# Patient Record
Sex: MALE | Race: WHITE
[De-identification: names, ages, dates, MRNs, and addresses within clinical notes are randomized per-mention and may not be internally consistent; named-entity substitution may affect disease eponyms.]

---

## 2018-12-23 ENCOUNTER — HOSPITAL ENCOUNTER (INPATIENT)
Dept: HOSPITAL 10 - MS1 | Age: 63
LOS: 5 days | Discharge: HOME | DRG: 435 | End: 2018-12-28
Attending: INTERNAL MEDICINE | Admitting: HOSPITALIST
Payer: COMMERCIAL

## 2018-12-23 ENCOUNTER — HOSPITAL ENCOUNTER (INPATIENT)
Dept: HOSPITAL 91 - MS1 | Age: 63
LOS: 5 days | Discharge: HOME | DRG: 435 | End: 2018-12-28
Payer: COMMERCIAL

## 2018-12-23 VITALS — DIASTOLIC BLOOD PRESSURE: 67 MMHG | SYSTOLIC BLOOD PRESSURE: 123 MMHG | RESPIRATION RATE: 18 BRPM | HEART RATE: 61 BPM

## 2018-12-23 VITALS — SYSTOLIC BLOOD PRESSURE: 133 MMHG | DIASTOLIC BLOOD PRESSURE: 63 MMHG | HEART RATE: 63 BPM | RESPIRATION RATE: 16 BRPM

## 2018-12-23 VITALS
HEIGHT: 67 IN | WEIGHT: 176.37 LBS | HEIGHT: 67 IN | BODY MASS INDEX: 27.68 KG/M2 | WEIGHT: 176.37 LBS | BODY MASS INDEX: 27.68 KG/M2

## 2018-12-23 DIAGNOSIS — C23: Primary | ICD-10-CM

## 2018-12-23 DIAGNOSIS — I10: ICD-10-CM

## 2018-12-23 DIAGNOSIS — K83.1: ICD-10-CM

## 2018-12-23 DIAGNOSIS — I42.9: ICD-10-CM

## 2018-12-23 DIAGNOSIS — E11.9: ICD-10-CM

## 2018-12-23 DIAGNOSIS — Z95.1: ICD-10-CM

## 2018-12-23 DIAGNOSIS — I25.10: ICD-10-CM

## 2018-12-23 DIAGNOSIS — E78.5: ICD-10-CM

## 2018-12-23 PROCEDURE — 82962 GLUCOSE BLOOD TEST: CPT

## 2018-12-23 PROCEDURE — 85730 THROMBOPLASTIN TIME PARTIAL: CPT

## 2018-12-23 PROCEDURE — 74330 X-RAY BILE/PANC ENDOSCOPY: CPT

## 2018-12-23 PROCEDURE — 93005 ELECTROCARDIOGRAM TRACING: CPT

## 2018-12-23 PROCEDURE — 97161 PT EVAL LOW COMPLEX 20 MIN: CPT

## 2018-12-23 PROCEDURE — 80053 COMPREHEN METABOLIC PANEL: CPT

## 2018-12-23 PROCEDURE — 88305 TISSUE EXAM BY PATHOLOGIST: CPT

## 2018-12-23 PROCEDURE — 82105 ALPHA-FETOPROTEIN SERUM: CPT

## 2018-12-23 PROCEDURE — 85025 COMPLETE CBC W/AUTO DIFF WBC: CPT

## 2018-12-23 PROCEDURE — C2617 STENT, NON-COR, TEM W/O DEL: HCPCS

## 2018-12-23 PROCEDURE — 71250 CT THORAX DX C-: CPT

## 2018-12-23 PROCEDURE — 88104 CYTOPATH FL NONGYN SMEARS: CPT

## 2018-12-23 PROCEDURE — 83036 HEMOGLOBIN GLYCOSYLATED A1C: CPT

## 2018-12-23 PROCEDURE — 74181 MRI ABDOMEN W/O CONTRAST: CPT

## 2018-12-23 PROCEDURE — 86301 IMMUNOASSAY TUMOR CA 19-9: CPT

## 2018-12-23 PROCEDURE — 85610 PROTHROMBIN TIME: CPT

## 2018-12-23 PROCEDURE — 93306 TTE W/DOPPLER COMPLETE: CPT

## 2018-12-23 NOTE — HP
Date/Time of Note


Date/Time of Note


DATE: 12/23/18 


TIME: 23:24





Assessment/Plan


VTE Prophylaxis


SCD applied (from Nsg):  Yes


Pharmacological prophylaxis:  NA/contraindicated


Pharm contraindication:  other (Patient awaiting for a possible biopsy)





Assessment/Plan


Assessment/Plan





 63-year-old male with a history of CAD with CABG, hypertension, dyslipidemia 


with painless jaundice, pruritus, 20 pounds weight loss in 6 weeks and petechial


rash with a CT finding suspicious for metastatic cholangiocarcinoma.  Lab shows 


hyperbilirubinemia with T-bili of 13





PLAN


-Need a tissue biopsy to confirm the diagnosis.  Daytime MD will talk to IR in 


the morning


-Surgery and oncology consult


-Antihistamine and Ursodiol for pruritus


-Patient status post permethrin cream as outpatient for a rash suspicious for 


scabies, but without improvement.  We may repeat treatment


-Continue home meds with adjustment as needed





HPI/ROS


Admit Date/Time


Admit Date/Time


Dec 23, 2018 at 19:55





Hx of Present Illness





This is a 63-year-old male with a history of CAD with CABG, hypertension, 


dyslipidemia who initially presented on outside hospital complaining of 


yellowing of skin, itching and rash.  Symptoms started over 2 weeks ago and has 


been progressively getting worse.  Patient has significant jaundice, more p


ronounced in his upper extremities and chest.  His total bilirubin at outside 


facility was 13.  He also has petechial rash which resembles scabs.  Patient 


reported a 20 pound weight loss in about 6 weeks.  Reported decreased p.o. 


intake because of decreased appetite.  Denied abdominal pain, chest pain, 


shortness of breath, urinary symptoms, constipation or diarrhea.  Prior to going


to the outside hospital ER, he was seen by his PMD and was prescribed Benadryl, 


prednisone and permethrin cream, but problem persisted.  Patient had CT 


abdomen/pelvis at the outside facility which showed:





1.  Hypoenhancing mass in the central liver, 6.2 x 5.9 x 5.4 cm, suspicious for 


cholangiocarcinoma, versus other hepatic tumor.  There is extension along the 


portal hilum and gallbladder neck.  There is intrahepatic biliary dilatation.


2.  Mild thickened appearance of the adjacent upper hepatic flexure colonic 


wall, which could be reactive, although colonic involvement is not excluded.


3.  Enlarged celiac lymph node, 2.3 x 1.6 cm, consistent with metastatic 


involvement


4.  Left renal cyst, 2.5 cm


.





PMH/Family/Social


Past Medical History


Medical History:  other (See HPI)


Coded Allergies:  


     No Known Allergy (Verified , 5/23/16)





Past Surgical History


Past Surgical Hx:  other (See HPI)





Family History


Significant Family History:  no pertinent family hx





Social History


Alcohol Use:  none


Smoking Status:  Former smoker


Drug Use:  none





Exam/Review of Systems


Vital Signs


Vitals





Vital Signs


  Date      Temp  Pulse  Resp  B/P (MAP)   Pulse Ox  O2          O2 Flow    FiO2


Time                                                 Delivery    Rate


  12/23/18  97.0     61    18      123/67        99  Room Air


     21:24                           (85)








Exam


Constitutional:  alert, oriented, well developed


Head:  normocephalic, atraumatic


Eyes:  EOMI, PERRL, icteric


Respiratory:  clear to auscultation, normal air movement


Cardiovascular:  regular rate and rhythm, nl pulses


Gastrointestinal:  soft, non-tender


Extremities:  normal pulses


Skin:  other (Skin is jaundiced and has a petechial rash resembling scabies on 


several part of his upper extremities and chest and back)





Results


Results 24 hrs





Laboratory Tests


                        Test
            12/23/18
20:40


                        Bedside Glucose          228  H














ADRI PEREZ MD                 Dec 23, 2018 23:24

## 2018-12-23 NOTE — NUR
NRSG NOTE:

PT ARRIVED ON FLOOR AT 2015 IN STABLE CONDITION. AOX4. NO ACUTE DISTRESS NOTED. NO SOB. VSS. 
DENIED PAIN. Welsh SPEAKING, UNDERSTANDS BASIC ENGLISH. ACCOMPANIED BY DAUGHTERS, WIFE. 
REPORT WAS GIVEN TO MARCELLE MCGOWAN. TRANSFERRED TO  IN STABLE CONDITION VIA TRANSPORT ON BED.

## 2018-12-24 VITALS — RESPIRATION RATE: 21 BRPM | DIASTOLIC BLOOD PRESSURE: 77 MMHG | SYSTOLIC BLOOD PRESSURE: 140 MMHG | HEART RATE: 74 BPM

## 2018-12-24 VITALS — HEART RATE: 74 BPM | RESPIRATION RATE: 23 BRPM | SYSTOLIC BLOOD PRESSURE: 140 MMHG | DIASTOLIC BLOOD PRESSURE: 63 MMHG

## 2018-12-24 VITALS — HEART RATE: 76 BPM | SYSTOLIC BLOOD PRESSURE: 132 MMHG | DIASTOLIC BLOOD PRESSURE: 61 MMHG | RESPIRATION RATE: 15 BRPM

## 2018-12-24 VITALS — RESPIRATION RATE: 24 BRPM | DIASTOLIC BLOOD PRESSURE: 79 MMHG | SYSTOLIC BLOOD PRESSURE: 158 MMHG | HEART RATE: 72 BPM

## 2018-12-24 VITALS — RESPIRATION RATE: 22 BRPM | DIASTOLIC BLOOD PRESSURE: 75 MMHG | SYSTOLIC BLOOD PRESSURE: 147 MMHG | HEART RATE: 72 BPM

## 2018-12-24 VITALS — SYSTOLIC BLOOD PRESSURE: 143 MMHG | DIASTOLIC BLOOD PRESSURE: 76 MMHG | HEART RATE: 75 BPM | RESPIRATION RATE: 20 BRPM

## 2018-12-24 VITALS — SYSTOLIC BLOOD PRESSURE: 158 MMHG | RESPIRATION RATE: 21 BRPM | DIASTOLIC BLOOD PRESSURE: 81 MMHG | HEART RATE: 76 BPM

## 2018-12-24 VITALS — SYSTOLIC BLOOD PRESSURE: 163 MMHG | RESPIRATION RATE: 16 BRPM | DIASTOLIC BLOOD PRESSURE: 83 MMHG | HEART RATE: 85 BPM

## 2018-12-24 VITALS — HEART RATE: 67 BPM | RESPIRATION RATE: 18 BRPM | SYSTOLIC BLOOD PRESSURE: 128 MMHG | DIASTOLIC BLOOD PRESSURE: 64 MMHG

## 2018-12-24 VITALS — DIASTOLIC BLOOD PRESSURE: 81 MMHG | HEART RATE: 73 BPM | RESPIRATION RATE: 20 BRPM | SYSTOLIC BLOOD PRESSURE: 172 MMHG

## 2018-12-24 VITALS — HEART RATE: 76 BPM | DIASTOLIC BLOOD PRESSURE: 76 MMHG | SYSTOLIC BLOOD PRESSURE: 137 MMHG | RESPIRATION RATE: 20 BRPM

## 2018-12-24 VITALS — DIASTOLIC BLOOD PRESSURE: 61 MMHG | SYSTOLIC BLOOD PRESSURE: 138 MMHG | HEART RATE: 74 BPM | RESPIRATION RATE: 18 BRPM

## 2018-12-24 VITALS — SYSTOLIC BLOOD PRESSURE: 139 MMHG | RESPIRATION RATE: 17 BRPM | DIASTOLIC BLOOD PRESSURE: 67 MMHG | HEART RATE: 74 BPM

## 2018-12-24 VITALS — DIASTOLIC BLOOD PRESSURE: 66 MMHG | RESPIRATION RATE: 20 BRPM | HEART RATE: 74 BPM | SYSTOLIC BLOOD PRESSURE: 139 MMHG

## 2018-12-24 VITALS — HEART RATE: 72 BPM | SYSTOLIC BLOOD PRESSURE: 152 MMHG | DIASTOLIC BLOOD PRESSURE: 72 MMHG | RESPIRATION RATE: 21 BRPM

## 2018-12-24 VITALS — HEART RATE: 74 BPM | SYSTOLIC BLOOD PRESSURE: 138 MMHG | RESPIRATION RATE: 18 BRPM | DIASTOLIC BLOOD PRESSURE: 61 MMHG

## 2018-12-24 VITALS — RESPIRATION RATE: 20 BRPM | SYSTOLIC BLOOD PRESSURE: 178 MMHG | DIASTOLIC BLOOD PRESSURE: 81 MMHG | HEART RATE: 69 BPM

## 2018-12-24 VITALS — HEART RATE: 74 BPM | RESPIRATION RATE: 16 BRPM | SYSTOLIC BLOOD PRESSURE: 148 MMHG | DIASTOLIC BLOOD PRESSURE: 75 MMHG

## 2018-12-24 VITALS — SYSTOLIC BLOOD PRESSURE: 152 MMHG | HEART RATE: 70 BPM | RESPIRATION RATE: 17 BRPM | DIASTOLIC BLOOD PRESSURE: 70 MMHG

## 2018-12-24 VITALS — DIASTOLIC BLOOD PRESSURE: 79 MMHG | RESPIRATION RATE: 24 BRPM | SYSTOLIC BLOOD PRESSURE: 162 MMHG | HEART RATE: 78 BPM

## 2018-12-24 VITALS — DIASTOLIC BLOOD PRESSURE: 80 MMHG | RESPIRATION RATE: 16 BRPM | SYSTOLIC BLOOD PRESSURE: 135 MMHG | HEART RATE: 87 BPM

## 2018-12-24 VITALS — DIASTOLIC BLOOD PRESSURE: 78 MMHG | SYSTOLIC BLOOD PRESSURE: 153 MMHG | RESPIRATION RATE: 24 BRPM | HEART RATE: 74 BPM

## 2018-12-24 VITALS — DIASTOLIC BLOOD PRESSURE: 74 MMHG | SYSTOLIC BLOOD PRESSURE: 153 MMHG | HEART RATE: 76 BPM | RESPIRATION RATE: 18 BRPM

## 2018-12-24 VITALS — SYSTOLIC BLOOD PRESSURE: 165 MMHG | RESPIRATION RATE: 20 BRPM | DIASTOLIC BLOOD PRESSURE: 76 MMHG | HEART RATE: 68 BPM

## 2018-12-24 VITALS — SYSTOLIC BLOOD PRESSURE: 146 MMHG | DIASTOLIC BLOOD PRESSURE: 77 MMHG | HEART RATE: 69 BPM | RESPIRATION RATE: 20 BRPM

## 2018-12-24 VITALS — DIASTOLIC BLOOD PRESSURE: 69 MMHG | RESPIRATION RATE: 23 BRPM | HEART RATE: 80 BPM | SYSTOLIC BLOOD PRESSURE: 141 MMHG

## 2018-12-24 VITALS — DIASTOLIC BLOOD PRESSURE: 81 MMHG | HEART RATE: 82 BPM | RESPIRATION RATE: 25 BRPM | SYSTOLIC BLOOD PRESSURE: 146 MMHG

## 2018-12-24 VITALS — SYSTOLIC BLOOD PRESSURE: 173 MMHG | DIASTOLIC BLOOD PRESSURE: 81 MMHG | RESPIRATION RATE: 20 BRPM | HEART RATE: 69 BPM

## 2018-12-24 VITALS — DIASTOLIC BLOOD PRESSURE: 69 MMHG | RESPIRATION RATE: 17 BRPM | HEART RATE: 76 BPM | SYSTOLIC BLOOD PRESSURE: 147 MMHG

## 2018-12-24 VITALS — HEART RATE: 80 BPM | RESPIRATION RATE: 16 BRPM | DIASTOLIC BLOOD PRESSURE: 87 MMHG | SYSTOLIC BLOOD PRESSURE: 133 MMHG

## 2018-12-24 VITALS — SYSTOLIC BLOOD PRESSURE: 163 MMHG | DIASTOLIC BLOOD PRESSURE: 83 MMHG | RESPIRATION RATE: 20 BRPM

## 2018-12-24 LAB
ADD MAN DIFF?: NO
ALANINE AMINOTRANSFERASE: 94 IU/L (ref 13–69)
ALBUMIN/GLOBULIN RATIO: 1.23
ALBUMIN: 3.7 G/DL (ref 3.3–4.9)
ALKALINE PHOSPHATASE: 249 IU/L (ref 42–121)
ALPHA FETOPROTEIN: 2.64 IU/L (ref 0–7.21)
ANION GAP: 14 (ref 5–13)
ASPARTATE AMINO TRANSFERASE: 59 IU/L (ref 15–46)
BASOPHIL #: 0 10^3/UL (ref 0–0.1)
BASOPHILS %: 0.4 % (ref 0–2)
BILIRUBIN,DIRECT: 7.7 MG/DL (ref 0–0.2)
BILIRUBIN,TOTAL: 9.4 MG/DL (ref 0.2–1.3)
BLOOD UREA NITROGEN: 12 MG/DL (ref 7–20)
CALCIUM: 9.1 MG/DL (ref 8.4–10.2)
CANCER ANTIGEN 19-9: 4640 U/ML (ref 0–37)
CARBON DIOXIDE: 24 MMOL/L (ref 21–31)
CHLORIDE: 99 MMOL/L (ref 97–110)
CREATININE: 0.61 MG/DL (ref 0.61–1.24)
EOSINOPHILS #: 0 10^3/UL (ref 0–0.5)
EOSINOPHILS %: 0.4 % (ref 0–7)
GLOBULIN: 3 G/DL (ref 1.3–3.2)
GLUCOSE: 246 MG/DL (ref 70–220)
HEMATOCRIT: 35.1 % (ref 42–52)
HEMOGLOBIN A1C: 10.1 % (ref 0–5.9)
HEMOGLOBIN: 12.3 G/DL (ref 14–18)
IMMATURE GRANS #M: 0.03 10^3/UL (ref 0–0.03)
IMMATURE GRANS % (M): 0.4 % (ref 0–0.43)
INR: 0.98
LYMPHOCYTES #: 1.9 10^3/UL (ref 0.8–2.9)
LYMPHOCYTES %: 25.5 % (ref 15–51)
MEAN CORPUSCULAR HEMOGLOBIN: 28.9 PG (ref 29–33)
MEAN CORPUSCULAR HGB CONC: 35 G/DL (ref 32–37)
MEAN CORPUSCULAR VOLUME: 82.6 FL (ref 82–101)
MEAN PLATELET VOLUME: 11.2 FL (ref 7.4–10.4)
MONOCYTE #: 0.8 10^3/UL (ref 0.3–0.9)
MONOCYTES %: 10.3 % (ref 0–11)
NEUTROPHIL #: 4.6 10^3/UL (ref 1.6–7.5)
NEUTROPHILS %: 63 % (ref 39–77)
NUCLEATED RED BLOOD CELLS #: 0 10^3/UL (ref 0–0)
NUCLEATED RED BLOOD CELLS%: 0 /100WBC (ref 0–0)
PARTIAL THROMBOPLASTIN TIME: 25.7 SEC (ref 23–35)
PLATELET COUNT: 253 10^3/UL (ref 140–415)
POTASSIUM: 4 MMOL/L (ref 3.5–5.1)
PROTIME: 13.1 SEC (ref 11.9–14.9)
PT RATIO: 1
RED BLOOD COUNT: 4.25 10^6/UL (ref 4.7–6.1)
RED CELL DISTRIBUTION WIDTH: 15.4 % (ref 11.5–14.5)
SODIUM: 137 MMOL/L (ref 135–144)
TOTAL PROTEIN: 6.7 G/DL (ref 6.1–8.1)
WHITE BLOOD COUNT: 7.3 10^3/UL (ref 4.8–10.8)

## 2018-12-24 PROCEDURE — 0F758DZ DILATION OF RIGHT HEPATIC DUCT WITH INTRALUMINAL DEVICE, VIA NATURAL OR ARTIFICIAL OPENING ENDOSCOPIC: ICD-10-PCS

## 2018-12-24 PROCEDURE — 0F768DZ DILATION OF LEFT HEPATIC DUCT WITH INTRALUMINAL DEVICE, VIA NATURAL OR ARTIFICIAL OPENING ENDOSCOPIC: ICD-10-PCS | Performed by: INTERNAL MEDICINE

## 2018-12-24 PROCEDURE — 0F768DZ DILATION OF LEFT HEPATIC DUCT WITH INTRALUMINAL DEVICE, VIA NATURAL OR ARTIFICIAL OPENING ENDOSCOPIC: ICD-10-PCS

## 2018-12-24 PROCEDURE — 0FD98ZX EXTRACTION OF COMMON BILE DUCT, VIA NATURAL OR ARTIFICIAL OPENING ENDOSCOPIC, DIAGNOSTIC: ICD-10-PCS

## 2018-12-24 PROCEDURE — 0FD98ZX EXTRACTION OF COMMON BILE DUCT, VIA NATURAL OR ARTIFICIAL OPENING ENDOSCOPIC, DIAGNOSTIC: ICD-10-PCS | Performed by: INTERNAL MEDICINE

## 2018-12-24 PROCEDURE — 0F758DZ DILATION OF RIGHT HEPATIC DUCT WITH INTRALUMINAL DEVICE, VIA NATURAL OR ARTIFICIAL OPENING ENDOSCOPIC: ICD-10-PCS | Performed by: INTERNAL MEDICINE

## 2018-12-24 RX ADMIN — DIPHENHYDRAMINE HYDROCHLORIDE 1 MG: 50 INJECTION, SOLUTION INTRAMUSCULAR; INTRAVENOUS at 07:01

## 2018-12-24 RX ADMIN — ATORVASTATIN CALCIUM SCH MG: 20 TABLET, FILM COATED ORAL at 20:05

## 2018-12-24 RX ADMIN — INSULIN ASPART 1 UNIT: 100 INJECTION, SOLUTION INTRAVENOUS; SUBCUTANEOUS at 12:44

## 2018-12-24 RX ADMIN — URSODIOL SCH MG: 300 CAPSULE ORAL at 10:14

## 2018-12-24 RX ADMIN — PANTOPRAZOLE SODIUM SCH MG: 40 TABLET, DELAYED RELEASE ORAL at 06:00

## 2018-12-24 RX ADMIN — URSODIOL 1 MG: 300 CAPSULE ORAL at 20:05

## 2018-12-24 RX ADMIN — INSULIN GLARGINE 1 UNITS: 100 INJECTION, SOLUTION SUBCUTANEOUS at 20:34

## 2018-12-24 RX ADMIN — URSODIOL SCH MG: 300 CAPSULE ORAL at 20:05

## 2018-12-24 RX ADMIN — INSULIN ASPART 1 UNIT: 100 INJECTION, SOLUTION INTRAVENOUS; SUBCUTANEOUS at 08:56

## 2018-12-24 RX ADMIN — INSULIN ASPART 1 UNIT: 100 INJECTION, SOLUTION INTRAVENOUS; SUBCUTANEOUS at 18:00

## 2018-12-24 RX ADMIN — INDOMETHACIN 1 MG: 50 SUPPOSITORY RECTAL at 15:40

## 2018-12-24 RX ADMIN — BENAZEPRIL HYDROCHLORIDE SCH MG: 10 TABLET ORAL at 08:45

## 2018-12-24 RX ADMIN — URSODIOL 1 MG: 300 CAPSULE ORAL at 12:42

## 2018-12-24 RX ADMIN — INSULIN ASPART 1 UNIT: 100 INJECTION, SOLUTION INTRAVENOUS; SUBCUTANEOUS at 18:53

## 2018-12-24 RX ADMIN — INSULIN ASPART 1 UNIT: 100 INJECTION, SOLUTION INTRAVENOUS; SUBCUTANEOUS at 17:00

## 2018-12-24 RX ADMIN — ASCORBIC ACID, VITAMIN A PALMITATE, CHOLECALCIFEROL, THIAMINE HYDROCHLORIDE, RIBOFLAVIN-5 PHOSPHATE SODIUM, PYRIDOXINE HYDROCHLORIDE, NIACINAMIDE, DEXPANTHENOL, ALPHA-TOCOPHEROL ACETATE, VITAMIN K1, FOLIC ACID, BIOTIN, CYANOCOBALAMIN 1 MLS/HR: 200; 3300; 200; 6; 3.6; 6; 40; 15; 10; 150; 600; 60; 5 INJECTION, SOLUTION INTRAVENOUS at 08:42

## 2018-12-24 RX ADMIN — ATORVASTATIN CALCIUM 1 MG: 20 TABLET, FILM COATED ORAL at 20:05

## 2018-12-24 RX ADMIN — BENAZEPRIL HYDROCHLORIDE 1 MG: 10 TABLET, FILM COATED ORAL at 08:45

## 2018-12-24 RX ADMIN — INSULIN ASPART 1 UNIT: 100 INJECTION, SOLUTION INTRAVENOUS; SUBCUTANEOUS at 12:00

## 2018-12-24 RX ADMIN — URSODIOL 1 MG: 300 CAPSULE ORAL at 10:14

## 2018-12-24 RX ADMIN — PANTOPRAZOLE SODIUM 1 MG: 40 TABLET, DELAYED RELEASE ORAL at 06:00

## 2018-12-24 RX ADMIN — INSULIN ASPART 1 UNIT: 100 INJECTION, SOLUTION INTRAVENOUS; SUBCUTANEOUS at 20:35

## 2018-12-24 RX ADMIN — URSODIOL SCH MG: 300 CAPSULE ORAL at 12:42

## 2018-12-24 NOTE — NUR
Received report from ROSLYN Gaona. Pt is a direct admit from [facility]. Pt accompanied by 
wife and two daughters. Pt in no acute distress. Admission assessment started at 2120. VSS. 
No complaints of pain. Dr. Mazariegos inputted orders and contacted re: pt's blood glucose of 231. 
Dr. Mazariegos ordered pt to be put on mild sliding scale, orders carried out. Pt resting 
comfortably in bed, call light within reach.

## 2018-12-24 NOTE — PREAC
Date/Time of Note


Date/Time of Note


DATE: 12/24/18 


TIME: 16:14





Anesthesia Eval and Record


Evaluation


Time Pre-Procedure Interview


DATE: 12/24/18 


TIME: 16:14


Age


63


Sex


male


NPO:  8 hrs


Preoperative diagnosis


Cholestasis, Obstructive Jaundice


Planned procedure


ERCP





Past Medical History


Past Medical History:  Includes


Cardio:  Dyslipidemia, CAD, CABG


Endo:  Diabetes


Heme:  Anemia





Surgery & Anesthesia Issues


No known issue





Meds


Anticoagulation:  No


Beta Blocker within 24 hr:  Yes


Reported Medications


Linagliptin (TRADJENTA) 5 Mg Tablet, 5 MG PO DAILY, TAB


   12/23/18


Simvastatin (Simvastatin) 40 Mg Tablet, 40 MG PO DAILY, #30 TAB


   12/23/18


Benazepril Hcl* (Benazepril Hcl*) 10 Mg Tablet, 10 MG PO DAILY, #30 TAB


   12/23/18


Glimepiride* (Amaryl*) 4 Mg Tablet, 4 MG PO DAILY, TAB


   12/23/18


Metformin Hcl* (Metformin Hcl*) 850 Mg Tablet, 850 MG PO BID, #30 TAB


   12/23/18


Linagliptin (TRADJENTA) 5 Mg Tablet, 5 MG PO DAILY, TAB


   12/23/18


[Unknown]   No Conflict Check


   2/15/10


Discontinued Reported Medications


Aspirin* (Aspirin* EC) 81 Mg Tablet.dr, 81 MG PO DAILY, TAB


   5/23/16


Ramipril (Ramipril) 5 Mg Capsule, 5 MG PO DAILY, CAP


   5/23/16


Glyburide, Micro/Metformin Hcl (Glyburide-Metformin) 2.5-500 Tab, 1 TAB PO 


DAILY, TAB


   5/23/16


Metoprolol Tartrate* (Lopressor*) 25 Mg Tab, 25 MG PO DAILY, #60 TAB


   5/23/16


Discontinued Scripts


Acetaminophen* (Tylophen*) 500 Mg Capsule, 1 CAP PO Q6H PRN for PAIN AND OR 


ELEVATED TEMP, #30 CAP


   Prov:SIRIA BROWN PA-C         8/9/16


Acetaminophen-Codeine* (Acetaminophen-Cod #3*) 300-30 Mg Tab, 1 TAB PO Q4H PRN 


for PAIN, #10 TAB


   Prov:WILBERTO ANTHONY MD         8/1/16


Erythromycin (Erythromycin Opth) 3.5 Gm Oint..gm., 1 APPLIC LEFT EYE QID for 7 


Days, EA


   Prov:WILBERTO ANTHONY MD         8/1/16





Current Medications


IV Flush (NS 3 ml) 3 ml PER PROTOCOL IV ;  Start 12/23/18 at 23:30


Ondansetron HCl (Zofran Inj) 4 mg Q6H  PRN IV NAUSEA AND/OR VOMITING;  Start 


12/23/18 at 23:30


Acetaminophen (Tylenol Tab) 650 mg Q6H  PRN PO PAIN LEVEL 1-3 OR FEVER;  Start 


12/23/18 at 23:30


Acetaminophen/ Hydrocodone Bitart (Norco (5/325)) 1 tab Q6H  PRN PO PAIN LEVEL 


4-6;  Start 12/23/18 at 23:30


Acetaminophen/ Hydrocodone Bitart (Norco (5/325)) 2 tab Q6H  PRN PO PAIN LEVEL 


7-10;  Start 12/23/18 at 23:30


Pantoprazole (Protonix Tab) 40 mg DAILY@06 PO  Last administered on 12/24/18at 


06:00; Admin Dose 40 MG;  Start 12/24/18 at 06:00


Benazepril HCl (Lotensin) 10 mg DAILY PO  Last administered on 12/24/18at 08:45;


 Admin Dose 10 MG;  Start 12/24/18 at 09:00


Atorvastatin Calcium (Lipitor) 20 mg DAILY@21 PO ;  Start 12/24/18 at 21:00


Miscellaneous Information 1 ea NOTE XX ;  Start 12/24/18 at 02:30


Glucose (Glutose) 15 gm Q15M  PRN PO DECREASED GLUCOSE;  Start 12/24/18 at 02:30


Glucose (Glutose) 22.5 gm Q15M  PRN PO DECREASED GLUCOSE;  Start 12/24/18 at 


02:30


Dextrose (D50w Syringe) 25 ml Q15M  PRN IV DECREASED GLUCOSE;  Start 12/24/18 at


 02:30


Dextrose (D50w Syringe) 50 ml Q15M  PRN IV DECREASED GLUCOSE;  Start 12/24/18 at


 02:30


Glucagon (Glucagen) 1 mg Q15M  PRN IM DECREASED GLUCOSE;  Start 12/24/18 at 


02:30


Glucose (Glutose) 15 gm Q15M  PRN BUCCAL DECREASED GLUCOSE;  Start 12/24/18 at 


02:30


Diphenhydramine HCl (Benadryl) 25 mg Q6H  PRN IV itching Last administered on 


12/24/18at 07:01; Admin Dose 25 MG;  Start 12/24/18 at 07:00


Ursodiol (Actigall) 300 mg TID PO  Last administered on 12/24/18at 12:42; Admin 


Dose 300 MG;  Start 12/24/18 at 09:00


Insulin Aspart (Novolog Insulin Pen) NOVOLOG *MILD* ALGORI... Q4 SC  Last 


administered on 12/24/18at 12:44; Admin Dose 3 UNIT;  Start 12/24/18 at 09:00


Insulin Glargine (Lantus) 12 units DAILY@2000 SC ;  Start 12/24/18 at 20:00


Insulin Aspart (Novolog Insulin Pen) 4 unit WITH  MEALS SC ;  Start 12/24/18 at 


12:00


Meds reviewed:  Yes





Allergies


Coded Allergies:  


     No Known Allergy (Verified , 5/23/16)


Allergies Reviewed:  Yes





Labs/Studies


Labs Reviewed:  Reviewed by anesthesiologist


Result Diagram:  


12/24/18 0035                                                                   


             12/24/18 0035





Laboratory Tests


12/24/18 00:35








Pregnancy test:  N/A


Studies:  ECG (SB, LAD), CXR (n/a)





Pre-procedure Exam


Last vitals





Vital Signs


  Date      Temp  Pulse  Resp  B/P (MAP)   Pulse Ox  O2          O2 Flow    FiO2


Time                                                 Delivery    Rate


  12/24/18  98.0     74    17      139/67        99  Room Air


     08:41                           (91)





Airway:  Adequate mouth opening, Adequate thyromental dist


Mallampati:  Mallampati II


Teeth:  Normal


Lung:  Normal


Heart:  Normal





ASA Physical Status


ASA physical status:  3


Emergency:  None





Planned Anesthetic


General/MAC:  ETT





Planned Pain Management


Parenteral pain med





Pre-operative Attestations


Prior to commencing anesthesia and surgery, the patient was re-evaluated, there 


was verification of:


*The patient's identity


*The results of appropriate recent lab work and preoperative vital signs


*The above evaluation not changing prior to induction


*Anesthetic plan, risk benefits, alternative and complications discussed with 


patient/family; questions answered; patient/family understands, accepts and 


wishes to proceed.











SHANA REYES MD              Dec 24, 2018 16:19

## 2018-12-24 NOTE — NUR
EOSS



VSS. Pt denies pain. Pt complains of itchiness, Dr. Mazariegos made aware. Pt instructed to use 
call light.

## 2018-12-24 NOTE — PN
Date/Time of Note


Date/Time of Note


DATE: 12/24/18 


TIME: 15:47





Assessment/Plan


VTE Prophylaxis


Risk score (from Nsg)>0 risk:  5


SCD applied (from Nsg):  Yes


Pharmacological prophylaxis:  heparin





Lines/Catheters


IV Catheter Type (from Nrsg):  Peripheral IV





Assessment/Plan


Hospital Course


64 yo male with CAD, DMII who presents with painless jaundice and obstructing 


mass


- ERCP today per Dr Maynard





DMII;


- Basal/bolus insulin with home medications





CAD:


- Statin





Discharge following endoscopic management


Result Diagram:  


12/24/18 0035                                                                   


            12/24/18 0035





Results 24hrs





Laboratory Tests


Test
             12/23/18
20:40  12/24/18
00:35  12/24/18
01:08  12/24/18
08:31


Bedside Glucose           228  H                          231  H


White Blood                                7.3


Count


Red Blood Count                          4.25  L


Hemoglobin                               12.3  L


Hematocrit                               35.1  L


Mean Corpuscular                          82.6


Volume


Mean Corpuscular                         28.9  L


Hemoglobin


Mean Corpuscular  
                      35.0  
  
               



Hemoglobin
Alicia


nt


Red Cell                                 15.4  H


Distribution


Width


Platelet Count                             253


Mean Platelet                            11.2  H


Volume


Immature                                 0.400


Granulocytes %


Neutrophils %                             63.0


Lymphocytes %                             25.5


Monocytes %                               10.3


Eosinophils %                              0.4


Basophils %                                0.4


Nucleated Red                              0.0


Blood Cells %


Immature                                 0.030


Granulocytes #


Neutrophils #                              4.6


Lymphocytes #                              1.9


Monocytes #                                0.8


Eosinophils #                              0.0


Basophils #                                0.0


Nucleated Red                              0.0


Blood Cells #


Sodium Level                               137


Potassium Level                            4.0


Chloride Level                              99


Carbon Dioxide                              24


Level


Anion Gap                                  14  H


Blood Urea                                  12


Nitrogen


Creatinine                                0.61


Est Glomerular    
               > 60  
         
               



Filtrat


Rate
mL/min


Glucose Level                             246  H


Hemoglobin A1c                           10.1  H


Calcium Level                              9.1


Total Bilirubin                           9.4  H


Direct Bilirubin                         7.70  H


Indirect                                  1.7  H


Bilirubin


Aspartate Amino   
                       59  H
  
               



Transf
(AST/SGOT


)


Alanine           
                       94  H
  
               



Aminotransferase



(ALT/SGPT)


Alkaline                                  249  H


Phosphatase


Total Protein                              6.7


Albumin                                    3.7


Globulin                                  3.00


Albumin/Globulin                          1.23


Ratio


Prothrombin Time                                                          13.1


Prothrombin Time                                                           1.0


Ratio


INR               
               
               
                      0.98  



International


Normalized
Ratio


Activated         
               
               
                      25.7  



Partial
Thrombop


last Time


Test
             12/24/18
08:54  12/24/18
12:42  
               



Bedside Glucose           243  H          240  H








Subjective


24 Hr Interval Summary


Free Text/Dictation


Feels well


Aware of likely cancer diagnosis and need for endoscopy





Exam/Review of Systems


Vital Signs


Vitals





Vital Signs


  Date      Temp  Pulse  Resp  B/P (MAP)   Pulse Ox  O2          O2 Flow    FiO2


Time                                                 Delivery    Rate


  12/24/18  98.0     74    17      139/67        99  Room Air


     08:41                           (91)








Intake and Output





12/23/18 12/23/18 12/24/18





1515:00


23:00


07:00





IntakeIntake Total


180 ml





OutputOutput Total


1 ml





BalanceBalance


179 ml














Medications


Medications





Current Medications


IV Flush (NS 3 ml) 3 ml PER PROTOCOL IV ;  Start 12/23/18 at 23:30


Ondansetron HCl (Zofran Inj) 4 mg Q6H  PRN IV NAUSEA AND/OR VOMITING;  Start 


12/23/18 at 23:30


Acetaminophen (Tylenol Tab) 650 mg Q6H  PRN PO PAIN LEVEL 1-3 OR FEVER;  Start 


12/23/18 at 23:30


Acetaminophen/ Hydrocodone Bitart (Norco (5/325)) 1 tab Q6H  PRN PO PAIN LEVEL 


4-6;  Start 12/23/18 at 23:30


Acetaminophen/ Hydrocodone Bitart (Norco (5/325)) 2 tab Q6H  PRN PO PAIN LEVEL 


7-10;  Start 12/23/18 at 23:30


Pantoprazole (Protonix Tab) 40 mg DAILY@06 PO  Last administered on 12/24/18at 


06:00; Admin Dose 40 MG;  Start 12/24/18 at 06:00


Benazepril HCl (Lotensin) 10 mg DAILY PO  Last administered on 12/24/18at 08:45;


Admin Dose 10 MG;  Start 12/24/18 at 09:00


Atorvastatin Calcium (Lipitor) 20 mg DAILY@21 PO ;  Start 12/24/18 at 21:00


Diagnostic Test (Pha) (Accu-Chek) 1 ea 02 XX ;  Start 12/25/18 at 02:00


Miscellaneous Information 1 ea NOTE XX ;  Start 12/24/18 at 02:30


Glucose (Glutose) 15 gm Q15M  PRN PO DECREASED GLUCOSE;  Start 12/24/18 at 02:30


Glucose (Glutose) 22.5 gm Q15M  PRN PO DECREASED GLUCOSE;  Start 12/24/18 at 


02:30


Dextrose (D50w Syringe) 25 ml Q15M  PRN IV DECREASED GLUCOSE;  Start 12/24/18 at


02:30


Dextrose (D50w Syringe) 50 ml Q15M  PRN IV DECREASED GLUCOSE;  Start 12/24/18 at


02:30


Glucagon (Glucagen) 1 mg Q15M  PRN IM DECREASED GLUCOSE;  Start 12/24/18 at 


02:30


Glucose (Glutose) 15 gm Q15M  PRN BUCCAL DECREASED GLUCOSE;  Start 12/24/18 at 


02:30


Diphenhydramine HCl (Benadryl) 25 mg Q6H  PRN IV itching Last administered on 


12/24/18at 07:01; Admin Dose 25 MG;  Start 12/24/18 at 07:00


Ursodiol (Actigall) 300 mg TID PO  Last administered on 12/24/18at 12:42; Admin 


Dose 300 MG;  Start 12/24/18 at 09:00


Diagnostic Test (Pha) (Accu-Chek) 1 ea 02 XX ;  Start 12/25/18 at 02:00


Insulin Aspart (Novolog Insulin Pen) NOVOLOG *MILD* ALGORI... Q4 SC  Last 


administered on 12/24/18at 12:44; Admin Dose 3 UNIT;  Start 12/24/18 at 09:00


Insulin Glargine (Lantus) 12 units DAILY@2000 SC ;  Start 12/24/18 at 20:00


Insulin Aspart (Novolog Insulin Pen) 4 unit WITH  MEALS SC ;  Start 12/24/18 at 


12:00











REJI EVANS MD          Dec 24, 2018 15:48

## 2018-12-24 NOTE — PAC
Date/Time of Note


Date/Time of Note


DATE: 12/24/18 


TIME: 18:20





Post-Anesthesia Notes


Post-Anesthesia Note


Last documented vital signs





Vital Signs


  Date      Temp  Pulse  Resp  B/P (MAP)   Pulse Ox  O2          O2 Flow    FiO2


Time                                                 Delivery    Rate


  12/24/18  98.0     74    17      139/67        99  Room Air


     18:21                           (91)





Activity:  WNL


Respiratory function:  WNL


Cardiovascular function:  WNL


Mental status:  Baseline


Pain reasonably controlled:  Yes


Hydration appropriate:  Yes


Nausea/Vomiting absent:  Yes











SHANA REYES MD              Dec 24, 2018 18:21

## 2018-12-24 NOTE — NUR
PT EVALUATION  AND DC,



Therapy day number 

1

Evaluation Start Time 

11:30

Evaluation End Time 

12:55

Evaluation Total Time 

85 min

Subjective 

Denies pain

Pain Scale

NUMERIC

Pain Intensity

0 (0-10)

Patient Stated Goal for Pain Relief 

0 (0-10)

Pain Level Comment 

N/A

Pre Treatment Vital Signs Stable 

Yes - BP:139/67 HR:74

Exercise Assessment Label

Bilat Lower Extremity

Exercise Type

Active ROM

Additional Exercise Comments 

AP, KNEE FLEX, EXT, SAQ , SLR , LAQ

Supine to Sit 

Independent

Transfer Sit to Stand Ability

Independent

Bed Mobility Sit to Supine

Independent

Bed Transfer Ability

Independent

Chair Transfer Ability

Independent

Sitting Tolerance

15 min

Gait Assist Levels 

Independent

Assistive Devices 

None

Ambulation Distance

300 feet

Additional Gait Comments 

RECIPROCAL, STEADY, STABLE WITH NO LOB ( WITHOUT AD ).

Weight Bearing Assessment Label

Bilat Lower Extremity

Weight Bearing Status 

Full Weight Bearing

Static Sitting Balance 

Fair minus

Dynamic Sitting Balance 

Good

Standing Static Balance 

Good

Dynamic Standing Balance 

Good

Safety Judgement 

Good

Activity Tolerance 

Good

Equipment Present 

IV pump

Post Treatment Pain Intensity 

0 0-10

Variance Documentation 

P/S SEE PT NOTE .

PT Technical Record Comment 

PT EVALUATION ,

S: RN CLEARED , PATIENT AGREEABLE .

O: PATIENT IS A 64 Y/O MALE WITH PMH : CAD, WITH CABG , HTN , PAINLESS 

JAUNDICE PRURITUS, WEIGHT LOSS 20' IN 6WKS, ADMITTED TO [facility] DUE TO 

GENERALIZED WEAKNESS AND TO R/O CHOLANGIOCARCINOMA .PATIENT IS ALERT AND 

ORIENTED TO SELF , SITUATION , INSTRUCTED HIM IN SAFETY FALL PRECAUTION , 

AND A/ROM BLE'S , PATIENT IS IND.IN BED MOBILITY , FUNCTIONAL TRANSFERS , 

GAIT TR WITHOUT AD PERFORMED 300' , GAIT WITHOUT AD , STEADY, STABLE AND 

RECIPROCAL WITH NO LOB , NO FURTHER SKILLED PT REQUIRES , PATIENT IS 

CLEARED TO AMBULATE WITH [facility] STAFF , RN NOTIFIED .

A: PATIENT LIVES WITH FAMILY IN A HOUSE WITH ONE ENTRY STEP, HAS BEEN 

FUNCTIONAL AND IND.AMBULATORY WITHOUT AD , PLAN TO RETURN HOME ONCE 

CLEARED BY MD , NO DME NEEDS .

P: PT EVALUATION , ONLY .

## 2018-12-24 NOTE — OPR
Date/Time of Note


Date/Time of Note


DATE: 12/24/18 


TIME: 18:43





Operative Report


Preoperative Diagnosis


liver mass


Postoperative Diagnosis


obstructed cbd  with dilated biliary ducts


Operation/Procedure Performed


ercp


Surgeon


see signature line


Assistant


none


Anesthesia Type:  general


Anesthesiologist:  SHANA REYES MD


Estimated Blood Loss:  none


Transfusion


   none


Specimen


brusching of cbd and bx of ampulla


Grafts/Implants


none


Tubes/Drains


cbd stents


Complications


none


Pt Condition Post Procedure:  stable


Disposition:  PACU


Indications


obstructive jaundice


Procedure Description


ercp done papillary sphincterotomy done 


malignant stricture of cbd noted 


 hepatic duct stents placed











ESTHER HOPSON MD            Dec 24, 2018 18:50

## 2018-12-24 NOTE — CONS
DATE OF ADMISSION: 12/23/2018

DATE OF CONSULTATION:  12/24/2018

 

 

 

Dear Dr. Rocha: 

 

Thank you for asking me to see Mr. Salazar in GI consultation.

 

HISTORY OF PRESENT ILLNESS:  The patient, as you know, is a 63-year-old [place] gentleman who at thi
s time is admitted to the hospital because of jaundice.  The CAT scan of the abdomen in the other [facility] showed evidence of a tumor in the liver, possibly cholangiocarcinoma.  The patient states becau
se of abdominal pain and itching and jaundice, he states he went to [facility] where he underwe
nt a CAT scan examination.

 

He does not smoke or drink.  He has no history of liver disease in the past.  He has lost about 20 po
unds of weight recently.

 

He has a history of a coronary artery bypass surgery done several years ago.

 

MEDICATIONS PRIOR TO THE ADMISSION INCLUDE:

1.  Benazepril.

2.  Hormones.

3.  Glimepiride.

4.  Linagliptin.

5.  Metformin.

 

SOCIAL HISTORY:  He used to work as a .

 

PHYSICAL EXAMINATION:

GENERAL:  The patient is a 63-year-old [place] gentleman who at this time is alert.  He has got lalitha
dice.  Has scratch walters all over the body.

VITAL SIGNS:  Temperature 98, pulse is 74, blood pressure is 139/67.

CARDIOVASCULAR:  Normal heart sounds.

RESPIRATORY:  Normal breath sounds.

ABDOMEN:  Showed a soft abdomen with no palpable masses.

 

LABORATORY WORKUP:  The BUN is 12, creatinine 0.61.  Bilirubin 9.4, direct 7.7, AST of 59, ALT 94, al
kaline phosphatase 249.  WBC 7300, hemoglobin 12.3, platelet count is 253,000.

 

CLINICAL IMPRESSION:  The patient presenting with history of jaundice which is obstructive jaundice, 
likely secondary to cholangiocarcinoma, likely [place] tumor, probably at bifurcation of left and ri
ght hepatic ducts with biliary dilatation, which is intrahepatic.  He has got history.  I doubt if we
 are dealing with hepatitis or hepatocellular causes.

 

He could have a fatty liver as a result of diabetes.

 

He could have TURNER syndrome, underlying disease.

 

History of diabetes and hypertension.

 

PLAN:  At this time, recommend ERCP.  I discussed this with the patient and patient's daughter regard
ing the ERCP, biliary stenting placement, etc.  Patient will eventually need a biopsy if we cannot ac
complish a biopsy through the ERCP.

 

Once again, Doctor, thank you for this consultation.

 

 

Dictated By: ESTHER SEGOVIA/PERLA

DD:    12/24/2018 14:10:58

DT:    12/24/2018 14:25:54

Conf#: [id number]

DID#:  [id number]

CC: EDDIE ROCHA;*EndCC*

## 2018-12-24 NOTE — NUR
EOSS



Patient in no acute distress during shift. Patient currently off unit for ERCP. Blood 
glucose monitored Q4H, insulin coverage given as ordered. VSS. Dressing to left index finger 
changed. Patient ambulates with a steady gait independently. PT cleared patient to ambulate 
with nursing staff today. Hourly rounds done, call light in reach. Will endorse plan of care 
to oncoming shift.

## 2018-12-24 NOTE — CONS
Date/Time of Note


Date/Time of Note


DATE: 12/24/18 


TIME: 14:13





Assessment/Plan


Assessment/Plan


Additional Assessment/Plan


Preoperative cardiac risk stratification


Jaundice


CAD with history of CABG


Hypertension





-Patient presents with jaundice and is currently undergoing GI workup for 


concern for malignancy.  From a cardiac perspective, patient with history of 


CABG approximately 2009.  He is active and can climb at least 2 flights of 


stairs and denies any symptoms of exertional chest pain or shortness of breath. 


ECG with no significant ischemic abnormalities.  Given his risk factors, patient


is at an intermediate risk for any untoward cardiac events for endoscopy/ERCP.  


The benefits likely outweigh the risks.





Consultation Date/Type/Reason


Admit Date/Time


Dec 23, 2018 at 19:55


Type of Consult


cv


Reason for Consultation


Preoperative cardiac risk stratification





Hx of Present Illness


This is a 63-year-old male with past medical history of coronary artery disease 


with CABG approximately 2009, hypertension who presents with pruritus and 


jaundice progressing over the past 2 weeks.  Patient was transferred to our 


facility secondary to insurance reasons.  In discussion with patient and 


daughter over the phone, he denies any exertional chest pain, shortness of 


breath, or dizziness.  He can climb at least 2 flights of stairs.  He denies any


exertional symptoms.  He has been doing well since his CABG from a cardiac 


standpoint.


12 point review of systems was performed with all pertinent positives and 


negatives mentioned above and all else is negative





Past Medical History


Medical History:  coronary artery disease, hypertension, other (See HPI)


Medications





Current Medications


IV Flush (NS 3 ml) 3 ml PER PROTOCOL IV ;  Start 12/23/18 at 23:30


Ondansetron HCl (Zofran Inj) 4 mg Q6H  PRN IV NAUSEA AND/OR VOMITING;  Start 


12/23/18 at 23:30


Acetaminophen (Tylenol Tab) 650 mg Q6H  PRN PO PAIN LEVEL 1-3 OR FEVER;  Start 


12/23/18 at 23:30


Acetaminophen/ Hydrocodone Bitart (Norco (5/325)) 1 tab Q6H  PRN PO PAIN LEVEL 


4-6;  Start 12/23/18 at 23:30


Acetaminophen/ Hydrocodone Bitart (Norco (5/325)) 2 tab Q6H  PRN PO PAIN LEVEL 


7-10;  Start 12/23/18 at 23:30


Pantoprazole (Protonix Tab) 40 mg DAILY@06 PO  Last administered on 12/24/18at 


06:00; Admin Dose 40 MG;  Start 12/24/18 at 06:00


Benazepril HCl (Lotensin) 10 mg DAILY PO  Last administered on 12/24/18at 08:45;


Admin Dose 10 MG;  Start 12/24/18 at 09:00


Atorvastatin Calcium (Lipitor) 20 mg DAILY@21 PO ;  Start 12/24/18 at 21:00


Diagnostic Test (Pha) (Accu-Chek) 1 ea 02 XX ;  Start 12/25/18 at 02:00


Miscellaneous Information 1 ea NOTE XX ;  Start 12/24/18 at 02:30


Glucose (Glutose) 15 gm Q15M  PRN PO DECREASED GLUCOSE;  Start 12/24/18 at 02:30


Glucose (Glutose) 22.5 gm Q15M  PRN PO DECREASED GLUCOSE;  Start 12/24/18 at 


02:30


Dextrose (D50w Syringe) 25 ml Q15M  PRN IV DECREASED GLUCOSE;  Start 12/24/18 at


02:30


Dextrose (D50w Syringe) 50 ml Q15M  PRN IV DECREASED GLUCOSE;  Start 12/24/18 at


02:30


Glucagon (Glucagen) 1 mg Q15M  PRN IM DECREASED GLUCOSE;  Start 12/24/18 at 


02:30


Glucose (Glutose) 15 gm Q15M  PRN BUCCAL DECREASED GLUCOSE;  Start 12/24/18 at 


02:30


Diphenhydramine HCl (Benadryl) 25 mg Q6H  PRN IV itching Last administered on 


12/24/18at 07:01; Admin Dose 25 MG;  Start 12/24/18 at 07:00


Ursodiol (Actigall) 300 mg TID PO  Last administered on 12/24/18at 12:42; Admin 


Dose 300 MG;  Start 12/24/18 at 09:00


Diagnostic Test (Pha) (Accu-Chek) 1 ea 02 XX ;  Start 12/25/18 at 02:00


Insulin Aspart (Novolog Insulin Pen) NOVOLOG *MILD* ALGORI... Q4 SC  Last 


administered on 12/24/18at 12:44; Admin Dose 3 UNIT;  Start 12/24/18 at 09:00


Insulin Glargine (Lantus) 12 units DAILY@2000 SC ;  Start 12/24/18 at 20:00


Insulin Aspart (Novolog Insulin Pen) 4 unit WITH  MEALS SC ;  Start 12/24/18 at 


12:00


Indomethacin (Indocin Supp) 100 mg ONCE  ONCE KS ;  Start 12/24/18 at 15:30;  


Stop 12/24/18 at 15:31


Allergies:  


Coded Allergies:  


     No Known Allergy (Verified , 5/23/16)





Past Surgical History


Past Surgical Hx:  coronary bypass surgery, other (Orthopedic surgery)





Family History


Significant Family History:  no pertinent family hx





Social History


Alcohol Use:  none


Smoking Status:  Former smoker


Drug Use:  none





Exam/Review of Systems


Vital Signs


Vitals





Vital Signs


  Date      Temp  Pulse  Resp  B/P (MAP)   Pulse Ox  O2          O2 Flow    FiO2


Time                                                 Delivery    Rate


  12/24/18  98.0     74    17      139/67        99  Room Air


     08:41                           (91)








Intake and Output





12/23/18 12/23/18 12/24/18





1515:00


23:00


07:00





IntakeIntake Total


180 ml





OutputOutput Total


1 ml





BalanceBalance


179 ml














Exam


Jaundiced, no apparent distress


Constitutional:  alert, oriented


Head:  normocephalic


Respiratory:  other (Coarse breath sounds bilaterally, no wheezing)


Cardiovascular:  regular rate and rhythm, other (S1-S2 heard)


Gastrointestinal:  soft, non-tender, bowel sounds


Extremities:  other (No significant edema)


Additional Comments


ECG done today with sinus rhythm,  ms, nonspecific ST abnormalities





Medications


Medications





Current Medications


IV Flush (NS 3 ml) 3 ml PER PROTOCOL IV ;  Start 12/23/18 at 23:30


Ondansetron HCl (Zofran Inj) 4 mg Q6H  PRN IV NAUSEA AND/OR VOMITING;  Start 


12/23/18 at 23:30


Acetaminophen (Tylenol Tab) 650 mg Q6H  PRN PO PAIN LEVEL 1-3 OR FEVER;  Start 


12/23/18 at 23:30


Acetaminophen/ Hydrocodone Bitart (Norco (5/325)) 1 tab Q6H  PRN PO PAIN LEVEL 


4-6;  Start 12/23/18 at 23:30


Acetaminophen/ Hydrocodone Bitart (Norco (5/325)) 2 tab Q6H  PRN PO PAIN LEVEL 


7-10;  Start 12/23/18 at 23:30


Pantoprazole (Protonix Tab) 40 mg DAILY@06 PO  Last administered on 12/24/18at 


06:00; Admin Dose 40 MG;  Start 12/24/18 at 06:00


Benazepril HCl (Lotensin) 10 mg DAILY PO  Last administered on 12/24/18at 08:45;


Admin Dose 10 MG;  Start 12/24/18 at 09:00


Atorvastatin Calcium (Lipitor) 20 mg DAILY@21 PO ;  Start 12/24/18 at 21:00


Diagnostic Test (Pha) (Accu-Chek) 1 ea 02 XX ;  Start 12/25/18 at 02:00


Miscellaneous Information 1 ea NOTE XX ;  Start 12/24/18 at 02:30


Glucose (Glutose) 15 gm Q15M  PRN PO DECREASED GLUCOSE;  Start 12/24/18 at 02:30


Glucose (Glutose) 22.5 gm Q15M  PRN PO DECREASED GLUCOSE;  Start 12/24/18 at 


02:30


Dextrose (D50w Syringe) 25 ml Q15M  PRN IV DECREASED GLUCOSE;  Start 12/24/18 at


02:30


Dextrose (D50w Syringe) 50 ml Q15M  PRN IV DECREASED GLUCOSE;  Start 12/24/18 at


02:30


Glucagon (Glucagen) 1 mg Q15M  PRN IM DECREASED GLUCOSE;  Start 12/24/18 at 


02:30


Glucose (Glutose) 15 gm Q15M  PRN BUCCAL DECREASED GLUCOSE;  Start 12/24/18 at 


02:30


Diphenhydramine HCl (Benadryl) 25 mg Q6H  PRN IV itching Last administered on 


12/24/18at 07:01; Admin Dose 25 MG;  Start 12/24/18 at 07:00


Ursodiol (Actigall) 300 mg TID PO  Last administered on 12/24/18at 12:42; Admin 


Dose 300 MG;  Start 12/24/18 at 09:00


Diagnostic Test (Pha) (Accu-Chek) 1 ea 02 XX ;  Start 12/25/18 at 02:00


Insulin Aspart (Novolog Insulin Pen) NOVOLOG *MILD* ALGORI... Q4 SC  Last 


administered on 12/24/18at 12:44; Admin Dose 3 UNIT;  Start 12/24/18 at 09:00


Insulin Glargine (Lantus) 12 units DAILY@2000 SC ;  Start 12/24/18 at 20:00


Insulin Aspart (Novolog Insulin Pen) 4 unit WITH  MEALS SC ;  Start 12/24/18 at 


12:00


Indomethacin (Indocin Supp) 100 mg ONCE  ONCE KS ;  Start 12/24/18 at 15:30;  


Stop 12/24/18 at 15:31











Nilo Jiang DO           Dec 24, 2018 14:18

## 2018-12-24 NOTE — GILP
DATE OF PROCEDURE:  

 

 

PROCEDURES:  ERCP, sphincterotomy, brushings of the malignant stricture of the bile duct, placement o
f 2 stents, one into the left hepatic duct and one into the right hepatic duct.

 

PREOPERATIVE DIAGNOSIS:  Obstructive jaundice due to malignancy of the liver, probably a common bile 
duct.

 

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  After brushings and sp
hincterotomy, biliary stents were placed.

 

DESCRIPTION OF PROCEDURE:  After informed written consent was obtained, the patient was intubated by 
anesthesiologist, Dr. Cárdenas.  When the patient was in the prone position while he was sleeping, Kelley
mpus video side-viewing duodenoscope was inserted into the oropharynx, then into the esophagus, subse
quently into the stomach and then into the duodenum.  The ampulla appeared to be rather prominent wit
h erythema on the mucosal surface.  At this time, by using the Dreamtome, the cannulation of the comm
on bile duct was performed in the distal common bile duct for about 3 cm.  Normal in diameter above t
his area, there is evidence of very insignificant amount of contrast indicating this is a stricture. 
 Proximal to this, both bilateral hepatic ducts were dilated.  Common hepatic duct was dilated.  At t
his time, sphincterotomy was performed.  About 8-mm cut was made and at this time, a brush was insert
ed into the malignant stricture of the distal common bile duct.  Brushings were obtained.

 

I elected to place 2 stents, one into the left hepatic duct and one into the right hepatic duct.  A g
uidewire was inserted into the left hepatic duct and a 7 x 9-cm CBD stent was placed into the right h
epatic duct.  A 7 x 7-cm Deport type of a biliary stent was placed and bile was found to be draini
ng through both stents.  Ampulla was appearing to be prominent.  Hence, biopsies of the ampulla were 
done.  In the beginning itself, a brush was inserted into the malignant stricture and brushings were 
done.  Scope at this time was withdrawn and the procedure was terminated.

 

PLAN:  Recommend CA 19-9 level, MRCP, and hematology-oncology consultation.  The patient probably nee
 a tertiary center for pancreatic surgery.

 

 

Dictated By: ESTHER SEGOVIA/PERLA

DD:    12/24/2018 18:55:06

DT:    12/24/2018 19:33:30

Conf#: [id number]

DID#:  [id number]

CC: REJI EVANS MD; DEDIE ROCHA;*[facility]*

## 2018-12-24 NOTE — RADRPT
Echocardiogram Report

 

Patient Name:  MARCIA BARRON  Gender:       Male

MRN:           [id number]             Accession #:  [id number]

Birth Date:    1955         Study Date:   24-Dec-2018

Sonographer:   [facility]     Location:     5537-A

 

Ref. Physician: NILO JIANG

Quality: Adequate

 

Procedures: Transthoracic echocardiogram with complete 2D, M-Mode, and 

doppler examination.

Indications: Pre-op, Cad, Cabg.

 

2D/M Mode                          Doppler

Measurement  Value  Normal Ranges  Measurement    Value  Normal Ranges

LVIDd 2D     5.2    3.5 - 5.6 cm   AV Peak Timothy    1.1    m/sec

LVIDs 2D     3.5    2.1 - 4.1 cm   AV Peak PG     5.0    mmHg

LVPWd 2D     0.9    0.6 - 1.1 cm   LVOT Peak Timothy  1.0    m/sec

IVSd 2D      1.1    0.6 - 1.1 cm   LVOT Peak PG   4.0    mmHg

AoR Diam 2D  2.7    2.0 - 3.7 cm   MV E Peak Timothy  0.5    m/sec

LA/Ao 2D     1      0 - 1          MV A Peak Timothy  0.7    m/sec

LA Dimen 2D  3.7    2.3 - 4.0 cm   MV E/A         0.7

MV Decel Time  345    msec

Lat E` Timothy     0.2    m/sec

Lateral E/E`   3.2

Med E` Timothy     0.1    m/sec

MV E/A         0.7

TAPSE          1.5    cm

TR Peak Timothy    2.2    m/sec

TR Peak PG     20.0   mmHg

RVSP           23.0   mmHg

 

Findings

Left Ventricle: Normal left ventricular cavity size.  Mild asymmetric 

septal hypertrophy.  Mild left ventricular systolic dysfunction.  

Ejection fraction is visually estimated at 45 %.  Tissue Doppler/Mitral 

Doppler indices are consistent with impaired relaxation (Stage I 

diastolic dysfunction).

Right Ventricle: Normal right ventricular size.  Normal right 

ventricular systolic function.

Left Atrium: The left atrium is normal in size.

Right Atrium: The right atrium is normal in size.

Mitral Valve: Mild mitral leaflet calcification.  Mild mitral annular 

calcification.  Trace mitral regurgitation.

Aortic Valve: Aortic sclerosis without significant stenosis.  Trace 

aortic valve regurgitation.

Tricuspid Valve: Normal appearance of the tricuspid valve.  Estimated 

peak PA systolic pressure 23 mmHg.  There is trace tricuspid 

regurgitation.

Pulmonic Valve: Normal pulmonic valve appearance.  There is trace 

pulmonic regurgitation.

Pericardium: Normal pericardium with no significant pericardial effusion.

Aorta: Normal aortic root.

IVC: Normal size and normal respiratory collapse consistent with normal 

right atrial pressure.

 

Conclusions

Normal left ventricular cavity size.  Mild asymmetric septal 

hypertrophy.  Mild left ventricular systolic dysfunction.  Ejection 

fraction is visually estimated at 45 %.  Tissue Doppler/Mitral Doppler 

indices are consistent with impaired relaxation (Stage I diastolic 

dysfunction).

Normal right ventricular size.  Normal right ventricular systolic 

function.

The left atrium is normal in size.

The right atrium is normal in size.

No significant valvular stenosis or regurgitation seen.

Normal pericardium with no significant pericardial effusion.

 

Electronically Signed By:

Nilo Jiang

24-Dec-2018 23:29:30  -0800

 

Patient Name: MARCIA BARRON

MRN: [id number]

Study Date: 24-Dec-2018

 

[id number]

## 2018-12-25 VITALS — RESPIRATION RATE: 18 BRPM | DIASTOLIC BLOOD PRESSURE: 70 MMHG | HEART RATE: 59 BPM | SYSTOLIC BLOOD PRESSURE: 124 MMHG

## 2018-12-25 VITALS — SYSTOLIC BLOOD PRESSURE: 124 MMHG | DIASTOLIC BLOOD PRESSURE: 66 MMHG | RESPIRATION RATE: 20 BRPM | HEART RATE: 66 BPM

## 2018-12-25 VITALS — DIASTOLIC BLOOD PRESSURE: 71 MMHG | RESPIRATION RATE: 18 BRPM | SYSTOLIC BLOOD PRESSURE: 147 MMHG | HEART RATE: 60 BPM

## 2018-12-25 VITALS — RESPIRATION RATE: 16 BRPM | DIASTOLIC BLOOD PRESSURE: 63 MMHG | HEART RATE: 65 BPM | SYSTOLIC BLOOD PRESSURE: 123 MMHG

## 2018-12-25 LAB
ADD MAN DIFF?: NO
ALANINE AMINOTRANSFERASE: 70 IU/L (ref 13–69)
ALBUMIN/GLOBULIN RATIO: 1.06
ALBUMIN: 3.4 G/DL (ref 3.3–4.9)
ALKALINE PHOSPHATASE: 253 IU/L (ref 42–121)
ANION GAP: 14 (ref 5–13)
ASPARTATE AMINO TRANSFERASE: 45 IU/L (ref 15–46)
BASOPHIL #: 0 10^3/UL (ref 0–0.1)
BASOPHILS %: 0.1 % (ref 0–2)
BILIRUBIN,DIRECT: 8.9 MG/DL (ref 0–0.2)
BILIRUBIN,TOTAL: 10.6 MG/DL (ref 0.2–1.3)
BLOOD UREA NITROGEN: 14 MG/DL (ref 7–20)
CALCIUM: 9.2 MG/DL (ref 8.4–10.2)
CARBON DIOXIDE: 25 MMOL/L (ref 21–31)
CHLORIDE: 102 MMOL/L (ref 97–110)
CREATININE: 0.65 MG/DL (ref 0.61–1.24)
EOSINOPHILS #: 0 10^3/UL (ref 0–0.5)
EOSINOPHILS %: 0 % (ref 0–7)
GLOBULIN: 3.2 G/DL (ref 1.3–3.2)
GLUCOSE: 273 MG/DL (ref 70–220)
HEMATOCRIT: 34.3 % (ref 42–52)
HEMOGLOBIN: 12.2 G/DL (ref 14–18)
IMMATURE GRANS #M: 0.03 10^3/UL (ref 0–0.03)
IMMATURE GRANS % (M): 0.4 % (ref 0–0.43)
LYMPHOCYTES #: 1.2 10^3/UL (ref 0.8–2.9)
LYMPHOCYTES %: 17.9 % (ref 15–51)
MEAN CORPUSCULAR HEMOGLOBIN: 28.9 PG (ref 29–33)
MEAN CORPUSCULAR HGB CONC: 35.6 G/DL (ref 32–37)
MEAN CORPUSCULAR VOLUME: 81.3 FL (ref 82–101)
MEAN PLATELET VOLUME: 11.5 FL (ref 7.4–10.4)
MONOCYTE #: 0.6 10^3/UL (ref 0.3–0.9)
MONOCYTES %: 9.2 % (ref 0–11)
NEUTROPHIL #: 5 10^3/UL (ref 1.6–7.5)
NEUTROPHILS %: 72.4 % (ref 39–77)
NUCLEATED RED BLOOD CELLS #: 0 10^3/UL (ref 0–0)
NUCLEATED RED BLOOD CELLS%: 0 /100WBC (ref 0–0)
PLATELET COUNT: 285 10^3/UL (ref 140–415)
POTASSIUM: 4.2 MMOL/L (ref 3.5–5.1)
RED BLOOD COUNT: 4.22 10^6/UL (ref 4.7–6.1)
RED CELL DISTRIBUTION WIDTH: 15.6 % (ref 11.5–14.5)
SODIUM: 141 MMOL/L (ref 135–144)
TOTAL PROTEIN: 6.6 G/DL (ref 6.1–8.1)
WHITE BLOOD COUNT: 6.9 10^3/UL (ref 4.8–10.8)

## 2018-12-25 RX ADMIN — PANTOPRAZOLE SODIUM SCH MG: 40 TABLET, DELAYED RELEASE ORAL at 05:12

## 2018-12-25 RX ADMIN — URSODIOL SCH MG: 300 CAPSULE ORAL at 08:44

## 2018-12-25 RX ADMIN — INSULIN GLARGINE 1 UNITS: 100 INJECTION, SOLUTION SUBCUTANEOUS at 20:08

## 2018-12-25 RX ADMIN — ATORVASTATIN CALCIUM SCH MG: 20 TABLET, FILM COATED ORAL at 20:05

## 2018-12-25 RX ADMIN — INSULIN ASPART 1 UNIT: 100 INJECTION, SOLUTION INTRAVENOUS; SUBCUTANEOUS at 09:49

## 2018-12-25 RX ADMIN — ATORVASTATIN CALCIUM 1 MG: 20 TABLET, FILM COATED ORAL at 20:05

## 2018-12-25 RX ADMIN — INSULIN ASPART 1 UNIT: 100 INJECTION, SOLUTION INTRAVENOUS; SUBCUTANEOUS at 02:03

## 2018-12-25 RX ADMIN — INSULIN ASPART 1 UNIT: 100 INJECTION, SOLUTION INTRAVENOUS; SUBCUTANEOUS at 20:07

## 2018-12-25 RX ADMIN — URSODIOL 1 MG: 300 CAPSULE ORAL at 20:05

## 2018-12-25 RX ADMIN — INSULIN ASPART 1 UNIT: 100 INJECTION, SOLUTION INTRAVENOUS; SUBCUTANEOUS at 13:06

## 2018-12-25 RX ADMIN — URSODIOL 1 MG: 300 CAPSULE ORAL at 08:44

## 2018-12-25 RX ADMIN — URSODIOL 1 MG: 300 CAPSULE ORAL at 13:24

## 2018-12-25 RX ADMIN — BENAZEPRIL HYDROCHLORIDE 1 MG: 10 TABLET, FILM COATED ORAL at 08:44

## 2018-12-25 RX ADMIN — INSULIN ASPART 1 UNIT: 100 INJECTION, SOLUTION INTRAVENOUS; SUBCUTANEOUS at 17:19

## 2018-12-25 RX ADMIN — INSULIN ASPART 1 UNIT: 100 INJECTION, SOLUTION INTRAVENOUS; SUBCUTANEOUS at 17:20

## 2018-12-25 RX ADMIN — URSODIOL SCH MG: 300 CAPSULE ORAL at 13:24

## 2018-12-25 RX ADMIN — URSODIOL SCH MG: 300 CAPSULE ORAL at 20:05

## 2018-12-25 RX ADMIN — BENAZEPRIL HYDROCHLORIDE SCH MG: 10 TABLET ORAL at 08:44

## 2018-12-25 RX ADMIN — INSULIN ASPART 1 UNIT: 100 INJECTION, SOLUTION INTRAVENOUS; SUBCUTANEOUS at 13:07

## 2018-12-25 RX ADMIN — PANTOPRAZOLE SODIUM 1 MG: 40 TABLET, DELAYED RELEASE ORAL at 05:12

## 2018-12-25 NOTE — EN
Date/Time of Note


Date/Time of Note


DATE: 12/25/18 


TIME: 12:05





Event Note Medicine


Medicine Event Note


Oncology:





Came to see pt in consultation and had pt taken to MRI just as I arrived.  Will 


see pt in AM.





MD SOLOMON Taveras STANLEY H MD           Dec 25, 2018 12:08

## 2018-12-25 NOTE — NUR
PATIENT SAFETY MAINTAINED.  PATIENT ABLE TO WALK TO BATHROOM, STEADY.  BLOOD GLUCOSE LEVELS 
WERE ELEVATED, INSULIN ORDERS WERE ADJUSTED BY MD.  PATIENT WENT DOWN FOR MRCP AND FOR CT 
SCAN.  DR. HOPSON SPOKE WITH FAMILY REGARDING THE RESULTS OF THE ERCP.  PATIENT'S DAUGHTER 
JEANINE STATED THAT SHE WOULD LIKE TO TALK WITH ONCOLOGIST WHEN HE COMES TO SEE PATIENT.  
PATIENT CARE WILL BE ENDORSE TO ONCOMING NURSE.

## 2018-12-25 NOTE — NUR
EOSS:



Pt. came back from procedure around 1950 last night. Pt. alert & oriented x4. All due meds 
given. Pt. received clear liquid dinner tray upon arriving back to the unit. Blood glucose 
monitored. 0200 blood glucose was 329, spoke to Dr. Piper and received new order for 5 
units novolog x1. Pt. walks steady without assist. Bed alarm is on, pt. free from injury. 
Will endorse care to oncoming nurse.

## 2018-12-26 VITALS — SYSTOLIC BLOOD PRESSURE: 131 MMHG | DIASTOLIC BLOOD PRESSURE: 72 MMHG | RESPIRATION RATE: 18 BRPM | HEART RATE: 63 BPM

## 2018-12-26 VITALS — SYSTOLIC BLOOD PRESSURE: 133 MMHG | HEART RATE: 64 BPM | RESPIRATION RATE: 18 BRPM | DIASTOLIC BLOOD PRESSURE: 75 MMHG

## 2018-12-26 VITALS — HEART RATE: 65 BPM | SYSTOLIC BLOOD PRESSURE: 144 MMHG | RESPIRATION RATE: 18 BRPM | DIASTOLIC BLOOD PRESSURE: 72 MMHG

## 2018-12-26 VITALS — SYSTOLIC BLOOD PRESSURE: 135 MMHG | DIASTOLIC BLOOD PRESSURE: 69 MMHG | HEART RATE: 72 BPM | RESPIRATION RATE: 18 BRPM

## 2018-12-26 LAB
ALANINE AMINOTRANSFERASE: 60 IU/L (ref 13–69)
ALBUMIN/GLOBULIN RATIO: 0.97
ALBUMIN: 3.5 G/DL (ref 3.3–4.9)
ALKALINE PHOSPHATASE: 276 IU/L (ref 42–121)
ANION GAP: 10 (ref 5–13)
ASPARTATE AMINO TRANSFERASE: 41 IU/L (ref 15–46)
BILIRUBIN,DIRECT: 7.8 MG/DL (ref 0–0.2)
BILIRUBIN,TOTAL: 9.3 MG/DL (ref 0.2–1.3)
BLOOD UREA NITROGEN: 13 MG/DL (ref 7–20)
CALCIUM: 9.2 MG/DL (ref 8.4–10.2)
CARBON DIOXIDE: 28 MMOL/L (ref 21–31)
CHLORIDE: 99 MMOL/L (ref 97–110)
CREATININE: 0.6 MG/DL (ref 0.61–1.24)
GLOBULIN: 3.6 G/DL (ref 1.3–3.2)
GLUCOSE: 291 MG/DL (ref 70–220)
POTASSIUM: 4.2 MMOL/L (ref 3.5–5.1)
SODIUM: 137 MMOL/L (ref 135–144)
TOTAL PROTEIN: 7.1 G/DL (ref 6.1–8.1)

## 2018-12-26 RX ADMIN — URSODIOL 1 MG: 300 CAPSULE ORAL at 08:04

## 2018-12-26 RX ADMIN — LINAGLIPTIN 1 MG: 5 TABLET, FILM COATED ORAL at 14:15

## 2018-12-26 RX ADMIN — INSULIN ASPART 1 UNIT: 100 INJECTION, SOLUTION INTRAVENOUS; SUBCUTANEOUS at 17:28

## 2018-12-26 RX ADMIN — BACITRACIN ZINC NEOMYCIN SULFATE POLYMYXIN B SULFATE SCH APPLIC: 400; 3.5; 5 OINTMENT TOPICAL at 20:55

## 2018-12-26 RX ADMIN — INSULIN ASPART 1 UNIT: 100 INJECTION, SOLUTION INTRAVENOUS; SUBCUTANEOUS at 17:29

## 2018-12-26 RX ADMIN — INSULIN ASPART 1 UNIT: 100 INJECTION, SOLUTION INTRAVENOUS; SUBCUTANEOUS at 12:34

## 2018-12-26 RX ADMIN — PANTOPRAZOLE SODIUM SCH MG: 40 TABLET, DELAYED RELEASE ORAL at 05:28

## 2018-12-26 RX ADMIN — URSODIOL 1 MG: 300 CAPSULE ORAL at 20:52

## 2018-12-26 RX ADMIN — URSODIOL SCH MG: 300 CAPSULE ORAL at 20:52

## 2018-12-26 RX ADMIN — ATORVASTATIN CALCIUM SCH MG: 20 TABLET, FILM COATED ORAL at 20:52

## 2018-12-26 RX ADMIN — BACITRACIN ZINC NEOMYCIN SULFATE POLYMYXIN B SULFATE 1 APPLIC: 400; 3.5; 5 OINTMENT TOPICAL at 20:55

## 2018-12-26 RX ADMIN — PANTOPRAZOLE SODIUM 1 MG: 40 TABLET, DELAYED RELEASE ORAL at 05:28

## 2018-12-26 RX ADMIN — URSODIOL SCH MG: 300 CAPSULE ORAL at 08:04

## 2018-12-26 RX ADMIN — INSULIN ASPART 1 UNIT: 100 INJECTION, SOLUTION INTRAVENOUS; SUBCUTANEOUS at 08:25

## 2018-12-26 RX ADMIN — URSODIOL SCH MG: 300 CAPSULE ORAL at 12:35

## 2018-12-26 RX ADMIN — INSULIN ASPART 1 UNIT: 100 INJECTION, SOLUTION INTRAVENOUS; SUBCUTANEOUS at 08:26

## 2018-12-26 RX ADMIN — INSULIN ASPART 1 UNIT: 100 INJECTION, SOLUTION INTRAVENOUS; SUBCUTANEOUS at 20:55

## 2018-12-26 RX ADMIN — INSULIN GLARGINE 1 UNITS: 100 INJECTION, SOLUTION SUBCUTANEOUS at 20:54

## 2018-12-26 RX ADMIN — LINAGLIPTIN SCH MG: 5 TABLET, FILM COATED ORAL at 14:15

## 2018-12-26 RX ADMIN — INSULIN ASPART 1 UNIT: 100 INJECTION, SOLUTION INTRAVENOUS; SUBCUTANEOUS at 12:35

## 2018-12-26 RX ADMIN — URSODIOL 1 MG: 300 CAPSULE ORAL at 12:35

## 2018-12-26 RX ADMIN — BENAZEPRIL HYDROCHLORIDE 1 MG: 10 TABLET, FILM COATED ORAL at 08:03

## 2018-12-26 RX ADMIN — ATORVASTATIN CALCIUM 1 MG: 20 TABLET, FILM COATED ORAL at 20:52

## 2018-12-26 RX ADMIN — BENAZEPRIL HYDROCHLORIDE SCH MG: 10 TABLET ORAL at 08:03

## 2018-12-26 NOTE — NUR
DR CARBAJAL HERE TO SEE PT, CALLED PT'S DAUGHTER JEANINE AS REQUESTED AND SPOKE WITH HER AND PT 
AT THE BEDSIDE.

## 2018-12-26 NOTE — NUR
RADIOLOGY CALLED TO LET THIS NURSE KNOW THAT PT MAY GO FOR MRCP EARLIER THAN 2230, WILL KEEP 
PT NPO STARTING NOW, PT MADE AWARE.

## 2018-12-26 NOTE — RADRPT
Vent Rate: 64 bpm

RR Interval: 0 msec

KS Interval: 164 msec

QRS Duration: 104 msec

QT Interval: 416 msec

QTC Interval: 429 msec

P-R-T Axis: 56 - -32 - 58 degrees

 

 Normal sinus rhythm

 Left axis deviation

 Nonspecific T wave abnormality

 Abnormal ECG

 

Electronically Signed By: Nilo Jiang

[id number]
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
## 2018-12-26 NOTE — EN
Date/Time of Note


Date/Time of Note


DATE: 12/26/18 


TIME: 09:30





Event Note Medicine


Medicine Event Note


Oncology note dictated.





64 y/o with obstructive jaundice due to mass in portahepatis.





 in markedly elevated and AFP is nl.





Pt has had an ERCP and stents placed in both Rt and Lt hepatic ducts.





MRI of abd demonstrates thgat abnormality-- including GB. Primary site likely 


cholangiocarcinoma, primary carcinoma of the gallbladder and ampullary carcinoma


(unlikely).





Will request a MRCP.  Try to demonstrate whether pt is a surgical candidate or 


if he will require chemotherapy as the primary treatment.  The latter is more 


likely.





CBD brushings and ampullary bx will likely be available in AM.











SNOW CARBAJAL MD           Dec 26, 2018 09:37

## 2018-12-26 NOTE — NUR
SHIFT  REPORT:



PT STABLE THIS SHIFT. NO SIGN OF DISTRESS NOTED. NO COMPLAINTS OF PAIN. DUE MEDS GIVEN AND 
TOLERATED WELL. AMBULATED TO THE BATHROOM WITH STEADY GAIT NOTED.NEEDS ATTENDED. CALL LIGHT 
PLACED WITHIN REACH. WILL CONTINUE TO MONITOR.

## 2018-12-26 NOTE — CONS
DATE OF ADMISSION: 12/23/2018

DATE OF CONSULTATION:  12/26/2018

 

 

 

TYPE OF CONSULTATION:  MEDICAL ONCOLOGY

 

REQUESTING PHYSICIAN:  Dr. hCino Patterson.

 

REASON FOR CONSULTATION:  Obstructive jaundice with possible cholangiocarcinoma.

 

Dear Dr. Patterson:  

 

Thank you very much for asking me to see this very interesting and pleasant patient in oncologic cons
ultation.  As you know, Mr. Phillips is a 63-year-old male who was admitted to [facility] on 12/23/2018.  The patient at that time was experiencing diffuse pruritus.  This had been pres
ent for approximately 3 weeks.  The patient also has been experiencing some loss of appetite, which w
as accompanied by weight loss.  He states that he may have lost as much as 20 pounds in the past 6 we
eks.

 

The patient does complain of rather diffuse GI discomfort.  This is associated with some nausea and v
omiting.  The patient states that the pain does not penetrate through to the back or to the shoulders
.

 

The patient has no complaints of fevers, chills or night sweats.

 

On admission to the hospital, the patient had a sodium 137, potassium 4, BUN 12, creatinine 0.61, glu
cose was 246, total bilirubin 9.4, direct bilirubin 7.0.  AST 59, ALT 94, alkaline phosphatase 249, t
otal protein 6.7, albumin was 3.7.  A hemoglobin A1c was 10.1.

 

The patient did undergo an ERCP performed by Dr. Maynard on 12/24/2018.  There was a papillary sphinct
erotomy done.  There appeared to be a stricture of the common bile duct.  There were 2 stents placed 
in the left hepatic duct and one in the right hepatic duct.  As noted, there was a stricture of the c
ommon bile duct.  Also the ampulla appeared to be quite prominent.  Biopsies of the ampulla were done
.  

 

On 07/25/2018, the total bilirubin was 10.6, direct bilirubin 8.9, AST 45, ALT 70, alkaline phosphata
se 257.  An alpha fetoprotein done on admission was 2.46 and a CA 19-9 was 4640.

 

Other studies thus far have also included a CT scan of the chest which I ordered on 12/25/2018.  This
 revealed a 3.6 cm subpleural lymph node along the posterior edge of the left major fissure.  There w
as no evidence of metastatic disease involving the chest.  An MRI of the abdomen was done also on 12/
25/2018.  This showed an irregular appearance of the gallbladder wall with ill-defined soft tissue ma
ss surrounding the gallbladder and extending into the hepatic hilum and along the course of the commo
n bile duct.  There was a soft tissue mass which appeared to extend to the liver involving at least h
epatic segment 4B.

 

There was cholelithiasis noted.  Also moderate intrahepatic biliary duct dilatation involving both he
patic lobes.

 

PAST MEDICAL HISTORY:  The patient's past history includes a history of coronary artery disease.  The
 patient has had a coronary artery bypass graft surgery approximately 10 years ago.

 

The patient also has had some other type of orthopedic surgery in the past.

 

The patient apparently also has a history of hypertension and possibly diabetes mellitus.

 

MEDICATIONS:  At the time of admission are unknown.

 

ALLERGIES:  He has no known allergies.

 

It should be noted that the patient did have a CT scan of the abdomen and pelvis at an outside [facility].  This is said to have noted the hypo enhancing mass in the central aspect of the liver.  There wa
s also an enlarged celiac node which was measured at 2.3 x 1.6 cm, consistent with metastatic involve
ment.  This is not noted on studies done at [facility].

 

SOCIAL HISTORY:  The patient is .  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  He has not
 knowingly been exposed to industrial toxins or ionizing radiation.  Patient does not smoke now and t
he smoking history is somewhat unclear.  Denies alcohol use.

 

PHYSICAL EXAMINATION:

GENERAL:  Reveals a well-developed, well-nourished male in no acute distress.

VITAL SIGNS:  Temperature 98.2, pulse 64 per minute and regular, respirations 18, blood pressure 133/
57 and pulse oximetry 96% on room air.

SKIN:  No ecchymosis, no petechiae or rashes, but there are some areas of excoriation.  The patient i
s icteric.

HEENT:  Normocephalic.  No evidence of trauma.  Pupils equal, round, reactive to light and accommodat
ion.  Sclerae are markedly icteric.  Oral mucosa is moist without lesions.  Tongue is well papillated
.  No gingival hyperplasia, no hypertrophy of Waldeyer ring.

NECK:  Supple.  No jugular venous distention or thyroid enlargement.  No carotid bruits.

CHEST:  Clear to auscultation and percussion.  No rhonchi, wheezes, rales or rubs.  There is a sterno
russ scar which is well healed.

BREASTS:  No gynecomastia.

HEART:  Regular sinus rhythm, no S3, S4 or murmurs.  No rubs.

NODES:  No palpable lymphadenopathy in lymph node bearing area.

ABDOMEN:  Soft, no masses or ascites.  Bowel sounds are active.  There is no rebound tenderness.  No 
hernia defects.

EXTREMITIES:  Good range of motion.  No clubbing, no edema or cyanosis.  No palpable cords or Homans 
sign.

NEUROLOGIC:  Normal.

 

DISCUSSION:  This patient has obstructive jaundice, which is due to a mass in the siria hepatis.  At 
this time, it is unclear if this is a primary cholangiocarcinoma or even a primary gallbladder cancer
.

 

The patient has had a biopsy of the ampulla as this did appear somewhat prominent on when Dr. Maynard 
performed the ERCP.  There are also brushings of the common bile duct.

 

It will be important to determine where the primary is and if there is any evidence of metastatic dis
ease in order to determine whether or not this patient may be a surgical candidate.

 

If he is a surgical candidate, I feel it would be best for the patient to be seen at a [facility].

 

If this lesion was not felt to be resectable however, the patient would then require chemotherapy.

 

Treatment of choice at this time would likely include the combination of NAB, paclitaxel and gemcitab
ine.  In addition, bevacizumab could be added.

 

If there is adequate tissue available, we would perform other genomic studies including _____ and jennifer
rosatellite instability.  The studies may help to determine whether the patient would be a candidate 
for other agents such as checkpoint inhibitors or EGFR blockers such as Vectbix.

 

At this time, I will order an MRCP to hopefully further delineate the extent and location of this mynor
or.

 

I have discussed the situation with the patient and his daughter, Dominique, who has helped with translati
on.

 

 

Dictated By: SNOW CARBAJAL MD

 

SR/NTS

DD:    12/26/2018 09:29:53

DT:    12/26/2018 10:31:46

Conf#: [id number]

DID#:  [id number]

CC: CHINO PATTERSON MD; EDDIE ROCHA;*EndCC*

## 2018-12-26 NOTE — CONS
Date/Time of Note


Date/Time of Note


DATE: 12/26/18 


TIME: 12:18





Assessment/Plan


Preoperative cardiac risk stratification


Jaundice


CAD with history of CABG


Hypertension





-Patient status post ERCP.  Denies any chest pain, shortness of breath, 


dizziness or palpitations at rest or with ambulation.  Blood pressure trend 


overall stable.  Continue antihypertensives as tolerated, restart aspirin if no 


plan for any further procedures.


Result Diagram:  


12/25/18 0622                                                                   


            12/26/18 1044





Results 24hrs





Laboratory Tests


Test
             12/25/18
12:38  12/25/18
17:08  12/25/18
20:04  12/26/18
01:34


Bedside Glucose           284  H          241  H           207            236  H


Test
             12/26/18
08:02  12/26/18
10:44  
               



Bedside Glucose            211


Sodium Level                               137


Potassium Level                            4.2


Chloride Level                              99


Carbon Dioxide                              28


Level


Anion Gap                                   10


Blood Urea                                  13


Nitrogen


Creatinine                               0.60  L


Est Glomerular    
               > 60  
         
               



Filtrat


Rate
mL/min


Glucose Level                             291  H


Calcium Level                              9.2


Total Bilirubin                           9.3  H


Direct Bilirubin                         7.80  H


Indirect                                  1.5  H


Bilirubin


Aspartate Amino   
                        41  
  
               



Transf
(AST/SGOT


)


Alanine           
                        60  
  
               



Aminotransferase



(ALT/SGPT)


Alkaline                                  276  H


Phosphatase


Total Protein                              7.1


Albumin                                    3.5


Globulin                                 3.60  H


Albumin/Globulin                          0.97


Ratio








Consultation Date/Type/Reason


Admit Date/Time


Dec 23, 2018 at 19:55


Initial Consult Date





Type of Consult


cv





24 HR Interval Summary


Free Text/Dictation


Denies chest pain, shortness of breath, palpitations





Exam/Review of Systems


Vital Signs


Vitals





Vital Signs


  Date      Temp  Pulse  Resp  B/P (MAP)   Pulse Ox  O2          O2 Flow    FiO2


Time                                                 Delivery    Rate


  12/26/18  98.2     64    18      133/75        96  Room Air


     07:55                           (94)


  12/24/18                                                             2.0


     18:21








Intake and Output





12/25/18 12/25/18 12/26/18





1515:00


23:00


07:00





IntakeIntake Total


1040 ml


480 ml





BalanceBalance


1040 ml


480 ml














Exam


No apparent distress


Constitutional:  alert, oriented


Head:  normocephalic


Respiratory:  clear to auscultation, normal air movement


Cardiovascular:  regular rate and rhythm, other (S1-S2 heard)


Gastrointestinal:  soft, non-tender, bowel sounds


Extremities:  other (No significant edema)





Medications


Medications





Current Medications


IV Flush (NS 3 ml) 3 ml PER PROTOCOL IV ;  Start 12/23/18 at 23:30


Ondansetron HCl (Zofran Inj) 4 mg Q6H  PRN IV NAUSEA AND/OR VOMITING;  Start 


12/23/18 at 23:30


Acetaminophen (Tylenol Tab) 650 mg Q6H  PRN PO PAIN LEVEL 1-3 OR FEVER;  Start 


12/23/18 at 23:30


Acetaminophen/ Hydrocodone Bitart (Norco (5/325)) 1 tab Q6H  PRN PO PAIN LEVEL 


4-6;  Start 12/23/18 at 23:30


Acetaminophen/ Hydrocodone Bitart (Norco (5/325)) 2 tab Q6H  PRN PO PAIN LEVEL 


7-10;  Start 12/23/18 at 23:30


Pantoprazole (Protonix Tab) 40 mg DAILY@06 PO  Last administered on 12/26/18at 


05:28; Admin Dose 40 MG;  Start 12/24/18 at 06:00


Benazepril HCl (Lotensin) 10 mg DAILY PO  Last administered on 12/26/18at 08:03;


Admin Dose 10 MG;  Start 12/24/18 at 09:00


Atorvastatin Calcium (Lipitor) 20 mg DAILY@21 PO  Last administered on 


12/25/18at 20:05; Admin Dose 20 MG;  Start 12/24/18 at 21:00


Miscellaneous Information 1 ea NOTE XX ;  Start 12/24/18 at 02:30


Glucose (Glutose) 15 gm Q15M  PRN PO DECREASED GLUCOSE;  Start 12/24/18 at 02:30


Glucose (Glutose) 22.5 gm Q15M  PRN PO DECREASED GLUCOSE;  Start 12/24/18 at 


02:30


Dextrose (D50w Syringe) 25 ml Q15M  PRN IV DECREASED GLUCOSE;  Start 12/24/18 at


02:30


Dextrose (D50w Syringe) 50 ml Q15M  PRN IV DECREASED GLUCOSE;  Start 12/24/18 at


02:30


Glucagon (Glucagen) 1 mg Q15M  PRN IM DECREASED GLUCOSE;  Start 12/24/18 at 


02:30


Glucose (Glutose) 15 gm Q15M  PRN BUCCAL DECREASED GLUCOSE;  Start 12/24/18 at 


02:30


Diphenhydramine HCl (Benadryl) 25 mg Q6H  PRN IV itching Last administered on 


12/24/18at 07:01; Admin Dose 25 MG;  Start 12/24/18 at 07:00


Ursodiol (Actigall) 300 mg TID PO  Last administered on 12/26/18at 08:04; Admin 


Dose 300 MG;  Start 12/24/18 at 09:00


Insulin Aspart (Novolog Insulin Pen) NOVOLOG *MILD* ALGORITHM WITH MEALS  


BEDTIME SC  Last administered on 12/26/18at 08:25; Admin Dose 2 UNIT;  Start 


12/24/18 at 21:00


Hydralazine HCl (Apresoline) 10 mg Q6H  PRN IV SBP ABOVE 170;  Start 12/24/18 at


21:00


Insulin Glargine (Lantus) 20 units DAILY@2000 SC  Last administered on 


12/25/18at 20:08; Admin Dose 20 UNITS;  Start 12/25/18 at 20:00


Insulin Aspart (Novolog Insulin Pen) 8 unit WITH  MEALS SC  Last administered on


12/26/18at 08:26; Admin Dose 8 UNIT;  Start 12/25/18 at 12:00











Nilo Jiang DO           Dec 26, 2018 12:19

## 2018-12-26 NOTE — PN
DATE:  12/25/2018

 

 

The patient at this time has no abdominal pain, admitted with obstructive jaundice due to tumor in th
e liver; however, CAT scan, ERCP, MRCP were all performed.  MRCP shows evidence of a stricture in the
 distal common bile duct in the mid to common bile duct, subsequently extending to the hepatic duct a
lexy as well as the hilum into the gallbladder area.  Stent was placed, one into the left duct, one in
to the right hepatic duct.  MRCP showed a large mass near the gallbladder extending towards the hepat
ic ducts as well as the hilum and the CA 19-9, which I ordered yesterday came back at 4640, indicatin
g the origin of the tumor as a cholangiocarcinoma.

 

At this time, clinically he is alert, not in distress.

 

At this time, we are waiting for the brushings from the common bile duct stricture and most likely we
 are dealing with cholangiocarcinoma.  At this time, I do not think the patient is operable.

 

PLAN:  We will obtain hematology/oncology consultation.

 

 

Dictated By: ESTHER SEGOVIA/NTS

DD:    12/25/2018 15:06:46

DT:    12/25/2018 15:24:14

Conf#: [id number]

DID#:  [id number]

CC: REJI EVANS MD; EDDIE ROCHA;*EndCC*

## 2018-12-26 NOTE — NUR
EOSS:  PT STABLE THROUGHOUT SHIFT.  ALL DUE MEDS GIVEN, HOURLY ROUNDING COMPLETED.  PT WENT 
FOR MRCP, PENDING RESULTS.  NO COMPLAINTS OF PAIN THROUGHOUT SHIFT.  PT SHOWERED, ALL LINEN 
CHANGED.  WILL ENDORSE CARE OF PT TO ONCOMING NIGHT SHIFT RN.

## 2018-12-26 NOTE — PN
Date/Time of Note


Date/Time of Note


DATE: 12/26/18 


TIME: 17:16





Assessment/Plan


VTE Prophylaxis


Risk score (from Nsg)>0 risk:  4


SCD applied (from Nsg):  Yes


Pharmacological prophylaxis:  heparin





Lines/Catheters


IV Catheter Type (from Nrsg):  Saline Lock


Urinary Cath still in place:  No





Assessment/Plan


Hospital Course


64 yo male with CAD, DMII who presents with painless jaundice and obstructing 


mass


- ERCP with stents placed.  Biopsy taken.  Has [place] tumor.  Dr Kam 


following from oncology





DMII;


- Basal/bolus insulin with home medications





CAD:


- Statin





Discharge plan pening


Result Diagram:  


12/25/18 0622                                                                   


            12/26/18 1044





Results 24hrs





Laboratory Tests


Test
             12/25/18
20:04  12/26/18
01:34  12/26/18
08:02  12/26/18
10:44


Bedside Glucose            207            236  H           211


Sodium Level                                                               137


Potassium Level                                                            4.2


Chloride Level                                                              99


Carbon Dioxide                                                              28


Level


Anion Gap                                                                   10


Blood Urea                                                                  13


Nitrogen


Creatinine                                                               0.60  L


Est Glomerular    
               
               
               > 60  



Filtrat


Rate
mL/min


Glucose Level                                                             291  H


Calcium Level                                                              9.2


Total Bilirubin                                                           9.3  H


Direct Bilirubin                                                         7.80  H


Indirect                                                                  1.5  H


Bilirubin


Aspartate Amino   
               
               
                        41  



Transf
(AST/SGOT


)


Alanine           
               
               
                        60  



Aminotransferase



(ALT/SGPT)


Alkaline                                                                  276  H


Phosphatase


Total Protein                                                              7.1


Albumin                                                                    3.5


Globulin                                                                 3.60  H


Albumin/Globulin                                                          0.97


Ratio


Test
             12/26/18
12:32  12/26/18
14:14  
               



Bedside Glucose           272  H          319  H








Subjective


24 Hr Interval Summary


Free Text/Dictation


Undergoign MRCP today





Exam/Review of Systems


Vital Signs


Vitals





Vital Signs


  Date      Temp  Pulse  Resp  B/P (MAP)   Pulse Ox  O2          O2 Flow    FiO2


Time                                                 Delivery    Rate


  12/26/18  97.6     72    18      135/69        99  Room Air


     14:16                           (91)


  12/24/18                                                             2.0


     18:21








Intake and Output





12/25/18 12/25/18 12/26/18





1515:00


23:00


07:00





IntakeIntake Total


1040 ml


480 ml





BalanceBalance


1040 ml


480 ml














Medications


Medications





Current Medications


IV Flush (NS 3 ml) 3 ml PER PROTOCOL IV ;  Start 12/23/18 at 23:30


Ondansetron HCl (Zofran Inj) 4 mg Q6H  PRN IV NAUSEA AND/OR VOMITING;  Start 


12/23/18 at 23:30


Acetaminophen (Tylenol Tab) 650 mg Q6H  PRN PO PAIN LEVEL 1-3 OR FEVER;  Start 


12/23/18 at 23:30


Acetaminophen/ Hydrocodone Bitart (Norco (5/325)) 1 tab Q6H  PRN PO PAIN LEVEL 


4-6;  Start 12/23/18 at 23:30


Acetaminophen/ Hydrocodone Bitart (Norco (5/325)) 2 tab Q6H  PRN PO PAIN LEVEL 


7-10;  Start 12/23/18 at 23:30


Pantoprazole (Protonix Tab) 40 mg DAILY@06 PO  Last administered on 12/26/18at 


05:28; Admin Dose 40 MG;  Start 12/24/18 at 06:00


Benazepril HCl (Lotensin) 10 mg DAILY PO  Last administered on 12/26/18at 08:03;


Admin Dose 10 MG;  Start 12/24/18 at 09:00


Atorvastatin Calcium (Lipitor) 20 mg DAILY@21 PO  Last administered on 


12/25/18at 20:05; Admin Dose 20 MG;  Start 12/24/18 at 21:00


Miscellaneous Information 1 ea NOTE XX ;  Start 12/24/18 at 02:30


Glucose (Glutose) 15 gm Q15M  PRN PO DECREASED GLUCOSE;  Start 12/24/18 at 02:30


Glucose (Glutose) 22.5 gm Q15M  PRN PO DECREASED GLUCOSE;  Start 12/24/18 at 


02:30


Dextrose (D50w Syringe) 25 ml Q15M  PRN IV DECREASED GLUCOSE;  Start 12/24/18 at


02:30


Dextrose (D50w Syringe) 50 ml Q15M  PRN IV DECREASED GLUCOSE;  Start 12/24/18 at


02:30


Glucagon (Glucagen) 1 mg Q15M  PRN IM DECREASED GLUCOSE;  Start 12/24/18 at 


02:30


Glucose (Glutose) 15 gm Q15M  PRN BUCCAL DECREASED GLUCOSE;  Start 12/24/18 at 


02:30


Diphenhydramine HCl (Benadryl) 25 mg Q6H  PRN IV itching Last administered on 


12/24/18at 07:01; Admin Dose 25 MG;  Start 12/24/18 at 07:00


Ursodiol (Actigall) 300 mg TID PO  Last administered on 12/26/18at 12:35; Admin 


Dose 300 MG;  Start 12/24/18 at 09:00


Insulin Aspart (Novolog Insulin Pen) NOVOLOG *MILD* ALGORITHM WITH MEALS  


BEDTIME SC  Last administered on 12/26/18at 12:34; Admin Dose 4 UNIT;  Start 


12/24/18 at 21:00


Hydralazine HCl (Apresoline) 10 mg Q6H  PRN IV SBP ABOVE 170;  Start 12/24/18 at


21:00


Insulin Aspart (Novolog Insulin Pen) 12 unit WITH  MEALS SC ;  Start 12/26/18 at


18:00


Insulin Glargine (Lantus) 25 units DAILY@2000 SC ;  Start 12/26/18 at 20:00


Linagliptin (Tradjenta) 5 mg DAILY PO  Last administered on 12/26/18at 14:15; 


Admin Dose 5 MG;  Start 12/26/18 at 13:30


Neomycin/ Polymyxin/ Bacitracin (Neosporin Topical Oint) 1 applic BID TOP ;  


Start 12/26/18 at 21:00











REJI EVANS MD          Dec 26, 2018 17:17

## 2018-12-27 VITALS — DIASTOLIC BLOOD PRESSURE: 57 MMHG | HEART RATE: 81 BPM | SYSTOLIC BLOOD PRESSURE: 91 MMHG | RESPIRATION RATE: 18 BRPM

## 2018-12-27 VITALS — DIASTOLIC BLOOD PRESSURE: 64 MMHG | HEART RATE: 67 BPM | SYSTOLIC BLOOD PRESSURE: 120 MMHG | RESPIRATION RATE: 18 BRPM

## 2018-12-27 VITALS — RESPIRATION RATE: 18 BRPM | DIASTOLIC BLOOD PRESSURE: 75 MMHG | HEART RATE: 62 BPM | SYSTOLIC BLOOD PRESSURE: 131 MMHG

## 2018-12-27 VITALS — HEART RATE: 85 BPM | DIASTOLIC BLOOD PRESSURE: 69 MMHG | RESPIRATION RATE: 18 BRPM | SYSTOLIC BLOOD PRESSURE: 139 MMHG

## 2018-12-27 LAB
ALANINE AMINOTRANSFERASE: 52 IU/L (ref 13–69)
ALBUMIN/GLOBULIN RATIO: 0.92
ALBUMIN: 3.5 G/DL (ref 3.3–4.9)
ALKALINE PHOSPHATASE: 274 IU/L (ref 42–121)
ANION GAP: 11 (ref 5–13)
ASPARTATE AMINO TRANSFERASE: 43 IU/L (ref 15–46)
BILIRUBIN,DIRECT: 8.5 MG/DL (ref 0–0.2)
BILIRUBIN,TOTAL: 9.8 MG/DL (ref 0.2–1.3)
BLOOD UREA NITROGEN: 13 MG/DL (ref 7–20)
CALCIUM: 9.3 MG/DL (ref 8.4–10.2)
CARBON DIOXIDE: 28 MMOL/L (ref 21–31)
CHLORIDE: 96 MMOL/L (ref 97–110)
CREATININE: 0.65 MG/DL (ref 0.61–1.24)
GLOBULIN: 3.8 G/DL (ref 1.3–3.2)
GLUCOSE: 247 MG/DL (ref 70–220)
POTASSIUM: 4 MMOL/L (ref 3.5–5.1)
SODIUM: 135 MMOL/L (ref 135–144)
TOTAL PROTEIN: 7.3 G/DL (ref 6.1–8.1)

## 2018-12-27 RX ADMIN — BENAZEPRIL HYDROCHLORIDE 1 MG: 10 TABLET, FILM COATED ORAL at 08:19

## 2018-12-27 RX ADMIN — LINAGLIPTIN SCH MG: 5 TABLET, FILM COATED ORAL at 08:18

## 2018-12-27 RX ADMIN — INSULIN GLARGINE 1 UNITS: 100 INJECTION, SOLUTION SUBCUTANEOUS at 20:18

## 2018-12-27 RX ADMIN — PANTOPRAZOLE SODIUM 1 MG: 40 TABLET, DELAYED RELEASE ORAL at 05:40

## 2018-12-27 RX ADMIN — BACITRACIN ZINC NEOMYCIN SULFATE POLYMYXIN B SULFATE SCH APPLIC: 400; 3.5; 5 OINTMENT TOPICAL at 20:17

## 2018-12-27 RX ADMIN — INSULIN ASPART 1 UNIT: 100 INJECTION, SOLUTION INTRAVENOUS; SUBCUTANEOUS at 08:21

## 2018-12-27 RX ADMIN — BACITRACIN ZINC NEOMYCIN SULFATE POLYMYXIN B SULFATE 1 APPLIC: 400; 3.5; 5 OINTMENT TOPICAL at 08:18

## 2018-12-27 RX ADMIN — URSODIOL SCH MG: 300 CAPSULE ORAL at 20:17

## 2018-12-27 RX ADMIN — URSODIOL SCH MG: 300 CAPSULE ORAL at 08:18

## 2018-12-27 RX ADMIN — INSULIN ASPART 1 UNIT: 100 INJECTION, SOLUTION INTRAVENOUS; SUBCUTANEOUS at 08:32

## 2018-12-27 RX ADMIN — MAGNESIUM HYDROXIDE 1 ML: 400 SUSPENSION ORAL at 17:54

## 2018-12-27 RX ADMIN — URSODIOL SCH MG: 300 CAPSULE ORAL at 12:14

## 2018-12-27 RX ADMIN — URSODIOL 1 MG: 300 CAPSULE ORAL at 12:14

## 2018-12-27 RX ADMIN — LINAGLIPTIN 1 MG: 5 TABLET, FILM COATED ORAL at 08:18

## 2018-12-27 RX ADMIN — INSULIN ASPART 1 UNIT: 100 INJECTION, SOLUTION INTRAVENOUS; SUBCUTANEOUS at 12:01

## 2018-12-27 RX ADMIN — BACITRACIN ZINC NEOMYCIN SULFATE POLYMYXIN B SULFATE SCH APPLIC: 400; 3.5; 5 OINTMENT TOPICAL at 08:18

## 2018-12-27 RX ADMIN — INSULIN ASPART 1 UNIT: 100 INJECTION, SOLUTION INTRAVENOUS; SUBCUTANEOUS at 20:19

## 2018-12-27 RX ADMIN — INSULIN ASPART 1 UNIT: 100 INJECTION, SOLUTION INTRAVENOUS; SUBCUTANEOUS at 17:03

## 2018-12-27 RX ADMIN — BENAZEPRIL HYDROCHLORIDE SCH MG: 10 TABLET ORAL at 08:19

## 2018-12-27 RX ADMIN — PANTOPRAZOLE SODIUM SCH MG: 40 TABLET, DELAYED RELEASE ORAL at 05:40

## 2018-12-27 RX ADMIN — BACITRACIN ZINC NEOMYCIN SULFATE POLYMYXIN B SULFATE 1 APPLIC: 400; 3.5; 5 OINTMENT TOPICAL at 20:17

## 2018-12-27 RX ADMIN — INSULIN ASPART 1 UNIT: 100 INJECTION, SOLUTION INTRAVENOUS; SUBCUTANEOUS at 17:05

## 2018-12-27 RX ADMIN — INSULIN ASPART 1 UNIT: 100 INJECTION, SOLUTION INTRAVENOUS; SUBCUTANEOUS at 11:58

## 2018-12-27 RX ADMIN — URSODIOL 1 MG: 300 CAPSULE ORAL at 08:18

## 2018-12-27 RX ADMIN — URSODIOL 1 MG: 300 CAPSULE ORAL at 20:17

## 2018-12-27 NOTE — PN
Date/Time of Note


Date/Time of Note


DATE: 12/27/18 


TIME: 16:36





Assessment/Plan


VTE Prophylaxis


Risk score (from Nsg)>0 risk:  4


SCD applied (from Ns):  Yes


Pharmacological prophylaxis:  other (ambulation)





Lines/Catheters


IV Catheter Type (from Nrsg):  Saline Lock


Urinary Cath still in place:  No





Assessment/Plan


Assessment/Plan


Discussed with pt and family.  Path is not diagnostic but it is suspicious for 


malignancy.  CA 19-9 was >4600.


Surgery is not a likely option, so chemotherapy was discussed.   We did talk 


about the 


incurability of this disease and that chemotherapy can offer palliation.  I also


mentioned that they could get a second


opinion at a tertiary center but they are not able to make decisions now.  I 


suggested that they talk among


themselves tonight and with any other family that want to be involved.  Dr. Kam can talk to them tomorrow


but will not be here tonight.   I did suggest that they start chemotherapy as an


outpatient, that it would likely take a 


couple of months to see a response and that there is little more to be done in 


the hospital.


Result Diagram:  


12/25/18 0622                                                                   


            12/27/18 1451





Results 24hrs





Laboratory Tests


Test
             12/26/18
17:24  12/26/18
20:50  12/27/18
08:18  12/27/18
11:56


Bedside Glucose           279  H          233  H           182            301  H


Test
             12/27/18
14:51  
               
               



Sodium Level               135


Potassium Level            4.0


Chloride Level             96  L


Carbon Dioxide              28


Level


Anion Gap                   11


Blood Urea                  13


Nitrogen


Creatinine                0.65


Est Glomerular    > 60  
         
               
               



Filtrat


Rate
mL/min


Glucose Level             247  H


Calcium Level              9.3


Total Bilirubin           9.8  H


Direct Bilirubin         8.50  H


Indirect                  1.3  H


Bilirubin


Aspartate Amino            43  
  
               
               



Transf
(AST/SGOT


)


Alanine                    52  
  
               
               



Aminotransferase



(ALT/SGPT)


Alkaline                  274  H


Phosphatase


Total Protein              7.3


Albumin                    3.5


Globulin                 3.80  H


Albumin/Globulin          0.92


Ratio








Subjective


24 Hr Interval Summary


Free Text/Dictation


Pt remains jaundiced but stable.  Discussed with family; daughter acted as 


.





Exam/Review of Systems


Vital Signs


Vitals





Vital Signs


  Date      Temp  Pulse  Resp  B/P (MAP)   Pulse Ox  O2          O2 Flow    FiO2


Time                                                 Delivery    Rate


  12/27/18  98.1     85    18      139/69        99  Room Air


     14:00                           (92)


  12/24/18                                                             2.0


     18:21








Intake and Output





12/26/18 12/26/18 12/27/18





1515:00


23:00


07:00





IntakeIntake Total


700 ml





BalanceBalance


700 ml














Exam


Head:  normocephalic


Eyes:  icteric


Neck:  supple


Respiratory:  clear to auscultation


Cardiovascular:  regular rate and rhythm


Gastrointestinal:  soft, non-tender





Medications


Medications





Current Medications


IV Flush (NS 3 ml) 3 ml PER PROTOCOL IV ;  Start 12/23/18 at 23:30


Ondansetron HCl (Zofran Inj) 4 mg Q6H  PRN IV NAUSEA AND/OR VOMITING;  Start 


12/23/18 at 23:30


Acetaminophen (Tylenol Tab) 650 mg Q6H  PRN PO PAIN LEVEL 1-3 OR FEVER;  Start 


12/23/18 at 23:30


Acetaminophen/ Hydrocodone Bitart (Norco (5/325)) 1 tab Q6H  PRN PO PAIN LEVEL 


4-6;  Start 12/23/18 at 23:30


Acetaminophen/ Hydrocodone Bitart (Norco (5/325)) 2 tab Q6H  PRN PO PAIN LEVEL 


7-10;  Start 12/23/18 at 23:30


Pantoprazole (Protonix Tab) 40 mg DAILY@06 PO  Last administered on 12/27/18at 


05:40; Admin Dose 40 MG;  Start 12/24/18 at 06:00


Benazepril HCl (Lotensin) 10 mg DAILY PO  Last administered on 12/27/18at 08:19;


Admin Dose 10 MG;  Start 12/24/18 at 09:00


Miscellaneous Information 1 ea NOTE XX ;  Start 12/24/18 at 02:30


Glucose (Glutose) 15 gm Q15M  PRN PO DECREASED GLUCOSE;  Start 12/24/18 at 02:30


Glucose (Glutose) 22.5 gm Q15M  PRN PO DECREASED GLUCOSE;  Start 12/24/18 at 


02:30


Dextrose (D50w Syringe) 25 ml Q15M  PRN IV DECREASED GLUCOSE;  Start 12/24/18 at


02:30


Dextrose (D50w Syringe) 50 ml Q15M  PRN IV DECREASED GLUCOSE;  Start 12/24/18 at


02:30


Glucagon (Glucagen) 1 mg Q15M  PRN IM DECREASED GLUCOSE;  Start 12/24/18 at 


02:30


Glucose (Glutose) 15 gm Q15M  PRN BUCCAL DECREASED GLUCOSE;  Start 12/24/18 at 


02:30


Diphenhydramine HCl (Benadryl) 25 mg Q6H  PRN IV itching Last administered on 


12/24/18at 07:01; Admin Dose 25 MG;  Start 12/24/18 at 07:00


Ursodiol (Actigall) 300 mg TID PO  Last administered on 12/27/18at 12:14; Admin 


Dose 300 MG;  Start 12/24/18 at 09:00


Insulin Aspart (Novolog Insulin Pen) NOVOLOG *MILD* ALGORITHM WITH MEALS  


BEDTIME SC  Last administered on 12/27/18at 12:01; Admin Dose 5 UNIT;  Start 


12/24/18 at 21:00


Hydralazine HCl (Apresoline) 10 mg Q6H  PRN IV SBP ABOVE 170;  Start 12/24/18 at


21:00


Linagliptin (Tradjenta) 5 mg DAILY PO  Last administered on 12/27/18at 08:18; 


Admin Dose 5 MG;  Start 12/26/18 at 13:30


Neomycin/ Polymyxin/ Bacitracin (Neosporin Topical Oint) 1 applic BID TOP  Last 


administered on 12/27/18at 08:18; Admin Dose 1 APPLIC;  Start 12/26/18 at 21:00


Insulin Aspart (Novolog Insulin Pen) 15 unit WITH  MEALS SC  Last administered 


on 12/27/18at 11:58; Admin Dose 15 UNIT;  Start 12/27/18 at 12:00


Insulin Glargine (Lantus) 30 units DAILY@2000 SC ;  Start 12/27/18 at 20:00











TEVIN MENARD MD            Dec 27, 2018 16:45

## 2018-12-27 NOTE — PN
Date/Time of Note


Date/Time of Note


DATE: 12/27/18 


TIME: 16:44





Assessment/Plan


VTE Prophylaxis


Risk score (from Nsg)>0 risk:  4


SCD applied (from Nsg):  Yes


Pharmacological prophylaxis:  heparin





Lines/Catheters


IV Catheter Type (from Nrsg):  Saline Lock


Urinary Cath still in place:  No





Assessment/Plan


Hospital Course


64 yo male with CAD, DMII who presents with painless jaundice and obstructing 


mass


- ERCP with stents placed.  Biopsy taken.  Has [place] tumor.  Dr Kam 


following from oncology


- Unfortuantely obstructive jaundice has not resolved with stent, but there is 


no  further ability to address this per GI





DMII;


- Basal/bolus insulin with home medications





CAD:


- Statin





Discharge tomorrow


Result Diagram:  


12/25/18 0622                                                                   


            12/27/18 1451





Results 24hrs





Laboratory Tests


Test
             12/26/18
17:24  12/26/18
20:50  12/27/18
08:18  12/27/18
11:56


Bedside Glucose           279  H          233  H           182            301  H


Test
             12/27/18
14:51  
               
               



Sodium Level               135


Potassium Level            4.0


Chloride Level             96  L


Carbon Dioxide              28


Level


Anion Gap                   11


Blood Urea                  13


Nitrogen


Creatinine                0.65


Est Glomerular    > 60  
         
               
               



Filtrat


Rate
mL/min


Glucose Level             247  H


Calcium Level              9.3


Total Bilirubin           9.8  H


Direct Bilirubin         8.50  H


Indirect                  1.3  H


Bilirubin


Aspartate Amino            43  
  
               
               



Transf
(AST/SGOT


)


Alanine                    52  
  
               
               



Aminotransferase



(ALT/SGPT)


Alkaline                  274  H


Phosphatase


Total Protein              7.3


Albumin                    3.5


Globulin                 3.80  H


Albumin/Globulin          0.92


Ratio








Subjective


24 Hr Interval Summary


Free Text/Dictation


Patient and daughter still quite anxious.  Do not want dc until seen by 


oncologist tomorrow





Exam/Review of Systems


Vital Signs


Vitals





Vital Signs


  Date      Temp  Pulse  Resp  B/P (MAP)   Pulse Ox  O2          O2 Flow    FiO2


Time                                                 Delivery    Rate


  12/27/18  98.1     85    18      139/69        99  Room Air


     14:00                           (92)


  12/24/18                                                             2.0


     18:21








Intake and Output





12/26/18 12/26/18 12/27/18





1515:00


23:00


07:00





IntakeIntake Total


700 ml





BalanceBalance


700 ml














Exam


Jaundiced


Comfortable appearing


No distress





Medications


Medications





Current Medications


IV Flush (NS 3 ml) 3 ml PER PROTOCOL IV ;  Start 12/23/18 at 23:30


Ondansetron HCl (Zofran Inj) 4 mg Q6H  PRN IV NAUSEA AND/OR VOMITING;  Start 


12/23/18 at 23:30


Acetaminophen (Tylenol Tab) 650 mg Q6H  PRN PO PAIN LEVEL 1-3 OR FEVER;  Start 


12/23/18 at 23:30


Acetaminophen/ Hydrocodone Bitart (Norco (5/325)) 1 tab Q6H  PRN PO PAIN LEVEL 


4-6;  Start 12/23/18 at 23:30


Acetaminophen/ Hydrocodone Bitart (Norco (5/325)) 2 tab Q6H  PRN PO PAIN LEVEL 


7-10;  Start 12/23/18 at 23:30


Pantoprazole (Protonix Tab) 40 mg DAILY@06 PO  Last administered on 12/27/18at 


05:40; Admin Dose 40 MG;  Start 12/24/18 at 06:00


Benazepril HCl (Lotensin) 10 mg DAILY PO  Last administered on 12/27/18at 08:19;


Admin Dose 10 MG;  Start 12/24/18 at 09:00


Miscellaneous Information 1 ea NOTE XX ;  Start 12/24/18 at 02:30


Glucose (Glutose) 15 gm Q15M  PRN PO DECREASED GLUCOSE;  Start 12/24/18 at 02:30


Glucose (Glutose) 22.5 gm Q15M  PRN PO DECREASED GLUCOSE;  Start 12/24/18 at 


02:30


Dextrose (D50w Syringe) 25 ml Q15M  PRN IV DECREASED GLUCOSE;  Start 12/24/18 at


02:30


Dextrose (D50w Syringe) 50 ml Q15M  PRN IV DECREASED GLUCOSE;  Start 12/24/18 at


02:30


Glucagon (Glucagen) 1 mg Q15M  PRN IM DECREASED GLUCOSE;  Start 12/24/18 at 


02:30


Glucose (Glutose) 15 gm Q15M  PRN BUCCAL DECREASED GLUCOSE;  Start 12/24/18 at 


02:30


Diphenhydramine HCl (Benadryl) 25 mg Q6H  PRN IV itching Last administered on 


12/24/18at 07:01; Admin Dose 25 MG;  Start 12/24/18 at 07:00


Ursodiol (Actigall) 300 mg TID PO  Last administered on 12/27/18at 12:14; Admin 


Dose 300 MG;  Start 12/24/18 at 09:00


Insulin Aspart (Novolog Insulin Pen) NOVOLOG *MILD* ALGORITHM WITH MEALS  


BEDTIME SC  Last administered on 12/27/18at 12:01; Admin Dose 5 UNIT;  Start 


12/24/18 at 21:00


Hydralazine HCl (Apresoline) 10 mg Q6H  PRN IV SBP ABOVE 170;  Start 12/24/18 at


21:00


Linagliptin (Tradjenta) 5 mg DAILY PO  Last administered on 12/27/18at 08:18; 


Admin Dose 5 MG;  Start 12/26/18 at 13:30


Neomycin/ Polymyxin/ Bacitracin (Neosporin Topical Oint) 1 applic BID TOP  Last 


administered on 12/27/18at 08:18; Admin Dose 1 APPLIC;  Start 12/26/18 at 21:00


Insulin Aspart (Novolog Insulin Pen) 15 unit WITH  MEALS SC  Last administered 


on 12/27/18at 11:58; Admin Dose 15 UNIT;  Start 12/27/18 at 12:00


Insulin Glargine (Lantus) 30 units DAILY@2000 SC ;  Start 12/27/18 at 20:00











REJI EVANS MD          Dec 27, 2018 16:45

## 2018-12-27 NOTE — NUR
END OF NOTE: PATIENT ON STABLE CONDITION. NO COMPLAINTS OF PAIN AND SIGNS OF DISTRESS. BLOOD 
SUGAR ON LEVEL WITH INSULIN COVERAGE. WOUND DRESSING DONE ON LEFT INDEX FINGER. FALL 
PRECAUTION OBSERVED. CALL LIGHT WITHIN REACH. ALL NEEDS MET.

## 2018-12-28 VITALS — DIASTOLIC BLOOD PRESSURE: 73 MMHG | SYSTOLIC BLOOD PRESSURE: 129 MMHG | RESPIRATION RATE: 16 BRPM | HEART RATE: 70 BPM

## 2018-12-28 VITALS — RESPIRATION RATE: 18 BRPM | DIASTOLIC BLOOD PRESSURE: 71 MMHG | SYSTOLIC BLOOD PRESSURE: 129 MMHG | HEART RATE: 75 BPM

## 2018-12-28 VITALS — DIASTOLIC BLOOD PRESSURE: 65 MMHG | SYSTOLIC BLOOD PRESSURE: 134 MMHG | RESPIRATION RATE: 18 BRPM | HEART RATE: 76 BPM

## 2018-12-28 RX ADMIN — URSODIOL 1 MG: 300 CAPSULE ORAL at 08:33

## 2018-12-28 RX ADMIN — URSODIOL 1 MG: 300 CAPSULE ORAL at 13:22

## 2018-12-28 RX ADMIN — INSULIN ASPART 1 UNIT: 100 INJECTION, SOLUTION INTRAVENOUS; SUBCUTANEOUS at 12:43

## 2018-12-28 RX ADMIN — INSULIN ASPART 1 UNIT: 100 INJECTION, SOLUTION INTRAVENOUS; SUBCUTANEOUS at 08:54

## 2018-12-28 RX ADMIN — URSODIOL SCH MG: 300 CAPSULE ORAL at 08:33

## 2018-12-28 RX ADMIN — INSULIN ASPART 1 UNIT: 100 INJECTION, SOLUTION INTRAVENOUS; SUBCUTANEOUS at 08:55

## 2018-12-28 RX ADMIN — BENAZEPRIL HYDROCHLORIDE SCH MG: 10 TABLET ORAL at 08:37

## 2018-12-28 RX ADMIN — BACITRACIN ZINC NEOMYCIN SULFATE POLYMYXIN B SULFATE 1 APPLIC: 400; 3.5; 5 OINTMENT TOPICAL at 08:39

## 2018-12-28 RX ADMIN — MAGNESIUM HYDROXIDE 1 ML: 400 SUSPENSION ORAL at 07:55

## 2018-12-28 RX ADMIN — HYDROCODONE BITARTRATE AND ACETAMINOPHEN 1 TAB: 5; 325 TABLET ORAL at 07:56

## 2018-12-28 RX ADMIN — URSODIOL SCH MG: 300 CAPSULE ORAL at 13:22

## 2018-12-28 RX ADMIN — BACITRACIN ZINC NEOMYCIN SULFATE POLYMYXIN B SULFATE SCH APPLIC: 400; 3.5; 5 OINTMENT TOPICAL at 08:39

## 2018-12-28 RX ADMIN — PANTOPRAZOLE SODIUM 1 MG: 40 TABLET, DELAYED RELEASE ORAL at 05:35

## 2018-12-28 RX ADMIN — BENAZEPRIL HYDROCHLORIDE 1 MG: 10 TABLET, FILM COATED ORAL at 08:37

## 2018-12-28 RX ADMIN — PANTOPRAZOLE SODIUM SCH MG: 40 TABLET, DELAYED RELEASE ORAL at 05:35

## 2018-12-28 RX ADMIN — INSULIN ASPART 1 UNIT: 100 INJECTION, SOLUTION INTRAVENOUS; SUBCUTANEOUS at 12:42

## 2018-12-28 NOTE — CONS
Date/Time of Note


Date/Time of Note


DATE: 12/28/18 


TIME: 11:49





Assessment/Plan


Preoperative cardiac risk stratification


Jaundice


CAD with history of CABG


Cardiomyopathy with left ventricular ejection fraction 45%


Hypertension





-Patient status post ERCP.  Denies any chest pain, shortness of breath, 


dizziness or palpitations at rest or with ambulation.  Blood pressure trend 


overall stable with occasional blood pressure on the lower side.  Continue 


antihypertensives as tolerated, restart aspirin if no plan for any further 


procedures.  If blood pressure remains stable, would consider starting beta-


blocker.


Result Diagram:  


12/25/18 0622                                                                   


            12/27/18 1451





Results 24hrs





Laboratory Tests


Test
             12/27/18
11:56  12/27/18
14:51  12/27/18
17:02  12/27/18
20:15


Bedside Glucose           301  H                          222  H          223  H


Sodium Level                               135


Potassium Level                            4.0


Chloride Level                             96  L


Carbon Dioxide                              28


Level


Anion Gap                                   11


Blood Urea                                  13


Nitrogen


Creatinine                                0.65


Est Glomerular    
               > 60  
         
               



Filtrat


Rate
mL/min


Glucose Level                             247  H


Calcium Level                              9.3


Total Bilirubin                           9.8  H


Direct Bilirubin                         8.50  H


Indirect                                  1.3  H


Bilirubin


Aspartate Amino   
                        43  
  
               



Transf
(AST/SGOT


)


Alanine           
                        52  
  
               



Aminotransferase



(ALT/SGPT)


Alkaline                                  274  H


Phosphatase


Total Protein                              7.3


Albumin                                    3.5


Globulin                                 3.80  H


Albumin/Globulin                          0.92


Ratio


Test
             12/28/18
07:41  12/28/18
08:52  
               



Bedside Glucose            144             198








Consultation Date/Type/Reason


Admit Date/Time


Dec 23, 2018 at 19:55


Initial Consult Date





Type of Consult


cv





24 HR Interval Summary


Free Text/Dictation


Patient ambulating without symptoms of chest pain or shortness of breath





Exam/Review of Systems


Vital Signs


Vitals





Vital Signs


  Date      Temp  Pulse  Resp  B/P (MAP)   Pulse Ox  O2          O2 Flow    FiO2


Time                                                 Delivery    Rate


  12/28/18  98.1     70    16      129/73        98  Room Air


     07:53                           (91)


  12/24/18                                                             2.0


     18:21








Intake and Output





12/27/18 12/27/18 12/28/18





1515:00


23:00


07:00





IntakeIntake Total


1160 ml


480 ml





BalanceBalance


1160 ml


480 ml














Exam


Jaundiced, family at bedside, no apparent distress


Constitutional:  alert, oriented


Head:  normocephalic


Respiratory:  clear to auscultation, normal air movement


Cardiovascular:  regular rate and rhythm, other (S1-S2 heard)


Gastrointestinal:  soft, non-tender, bowel sounds





Medications


Medications





Current Medications


IV Flush (NS 3 ml) 3 ml PER PROTOCOL IV ;  Start 12/23/18 at 23:30


Ondansetron HCl (Zofran Inj) 4 mg Q6H  PRN IV NAUSEA AND/OR VOMITING;  Start 


12/23/18 at 23:30


Acetaminophen (Tylenol Tab) 650 mg Q6H  PRN PO PAIN LEVEL 1-3 OR FEVER;  Start 


12/23/18 at 23:30


Acetaminophen/ Hydrocodone Bitart (Norco (5/325)) 1 tab Q6H  PRN PO PAIN LEVEL 


4-6 Last administered on 12/28/18at 07:56; Admin Dose 1 TAB;  Start 12/23/18 at 


23:30


Acetaminophen/ Hydrocodone Bitart (Norco (5/325)) 2 tab Q6H  PRN PO PAIN LEVEL 


7-10;  Start 12/23/18 at 23:30


Pantoprazole (Protonix Tab) 40 mg DAILY@06 PO  Last administered on 12/28/18at 


05:35; Admin Dose 40 MG;  Start 12/24/18 at 06:00


Benazepril HCl (Lotensin) 10 mg DAILY PO  Last administered on 12/28/18at 08:37;


Admin Dose 10 MG;  Start 12/24/18 at 09:00


Miscellaneous Information 1 ea NOTE XX ;  Start 12/24/18 at 02:30


Glucose (Glutose) 15 gm Q15M  PRN PO DECREASED GLUCOSE;  Start 12/24/18 at 02:30


Glucose (Glutose) 22.5 gm Q15M  PRN PO DECREASED GLUCOSE;  Start 12/24/18 at 


02:30


Dextrose (D50w Syringe) 25 ml Q15M  PRN IV DECREASED GLUCOSE;  Start 12/24/18 at


02:30


Dextrose (D50w Syringe) 50 ml Q15M  PRN IV DECREASED GLUCOSE;  Start 12/24/18 at


02:30


Glucagon (Glucagen) 1 mg Q15M  PRN IM DECREASED GLUCOSE;  Start 12/24/18 at 


02:30


Glucose (Glutose) 15 gm Q15M  PRN BUCCAL DECREASED GLUCOSE;  Start 12/24/18 at 


02:30


Diphenhydramine HCl (Benadryl) 25 mg Q6H  PRN IV itching Last administered on 


12/24/18at 07:01; Admin Dose 25 MG;  Start 12/24/18 at 07:00


Ursodiol (Actigall) 300 mg TID PO  Last administered on 12/28/18at 08:33; Admin 


Dose 300 MG;  Start 12/24/18 at 09:00


Insulin Aspart (Novolog Insulin Pen) NOVOLOG *MILD* ALGORITHM WITH MEALS  


BEDTIME SC  Last administered on 12/28/18at 08:55; Admin Dose 2 UNIT;  Start 


12/24/18 at 21:00


Hydralazine HCl (Apresoline) 10 mg Q6H  PRN IV SBP ABOVE 170;  Start 12/24/18 at


21:00


Neomycin/ Polymyxin/ Bacitracin (Neosporin Topical Oint) 1 applic BID TOP  Last 


administered on 12/28/18at 08:39; Admin Dose 1 APPLIC;  Start 12/26/18 at 21:00


Insulin Aspart (Novolog Insulin Pen) 15 unit WITH  MEALS SC  Last administered 


on 12/28/18at 08:54; Admin Dose 15 UNIT;  Start 12/27/18 at 12:00


Insulin Glargine (Lantus) 30 units DAILY@2000 SC  Last administered on 


12/27/18at 20:18; Admin Dose 30 UNITS;  Start 12/27/18 at 20:00


Magnesium Hydroxide (Milk Of Mag) 30 ml DAILY  PRN PO CONSTIPATION Last a


dministered on 12/28/18at 07:55; Admin Dose 30 ML;  Start 12/27/18 at 18:00











Nilo Jiang DO           Dec 28, 2018 11:51

## 2018-12-28 NOTE — PDOCDIS
Discharge Instructions


DIAGNOSIS


Discharge Diagnosis


Gall bladder cancer





CONDITION


                [id number]
Patient Condition:  [id number]
Stable








HOME CARE INSTRUCTIONS:


                [id number]
Special Diet:  Dcylh6s
Full Liquid








FOLLOW UP/APPOINTMENTS


Follow-up Plan


Make an appointment to see your oncologist Dr Kam next week


Start taking insulin as prescribed.  Stop taking your previous medications


It is important to see your primary doctor to manage your diabetes











REJI EVANS MD          Dec 28, 2018 15:24

## 2018-12-28 NOTE — NUR
Discharge Summary



Patient alert and oriented and in no acute distress. Vital signs stable. IV and ID removed. 
Patient and family verbalized understanding of all discharge instructions including follow 
ups and medications. Belongings including clothes and cell phone taken with patient. Patient 
escorted to private vehicle accompanied by family headed for home.

## 2018-12-28 NOTE — PN
Date/Time of Note


Date/Time of Note


DATE: 12/28/18 


TIME: 10:49





Assessment/Plan


VTE Prophylaxis


Risk score (from Nsg)>0 risk:  5


SCD applied (from Nsg):  No


SCD contraindicated:  low risk/ambulating


Pharmacological prophylaxis:  other (ambulation)





Lines/Catheters


IV Catheter Type (from Nrsg):  Saline Lock


Urinary Cath still in place:  No





Assessment/Plan


Assessment/Plan


Pt and family and I discussed plans again.  He can be discharged and will 


arrange an appointment with Dr. Kam for next week.


PET scan will be arranged as an outpatient.  Chemotherapy will be started after 


that.  I again suggested second opinion at a tertiary center if they like.


We will send records wherever they like.


Result Diagram:  


12/25/18 0622                                                                   


            12/27/18 1451





Results 24hrs





Laboratory Tests


Test
             12/27/18
11:56  12/27/18
14:51  12/27/18
17:02  12/27/18
20:15


Bedside Glucose           301  H                          222  H          223  H


Sodium Level                               135


Potassium Level                            4.0


Chloride Level                             96  L


Carbon Dioxide                              28


Level


Anion Gap                                   11


Blood Urea                                  13


Nitrogen


Creatinine                                0.65


Est Glomerular    
               > 60  
         
               



Filtrat


Rate
mL/min


Glucose Level                             247  H


Calcium Level                              9.3


Total Bilirubin                           9.8  H


Direct Bilirubin                         8.50  H


Indirect                                  1.3  H


Bilirubin


Aspartate Amino   
                        43  
  
               



Transf
(AST/SGOT


)


Alanine           
                        52  
  
               



Aminotransferase



(ALT/SGPT)


Alkaline                                  274  H


Phosphatase


Total Protein                              7.3


Albumin                                    3.5


Globulin                                 3.80  H


Albumin/Globulin                          0.92


Ratio


Test
             12/28/18
07:41  12/28/18
08:52  
               



Bedside Glucose            144             198








Subjective


24 Hr Interval Summary


Free Text/Dictation


Pt is stable.  Discussed with pt, wife and daughter.





Exam/Review of Systems


Vital Signs


Vitals





Vital Signs


  Date      Temp  Pulse  Resp  B/P (MAP)   Pulse Ox  O2          O2 Flow    FiO2


Time                                                 Delivery    Rate


  12/28/18  98.1     70    16      129/73        98  Room Air


     07:53                           (91)


  12/24/18                                                             2.0


     18:21








Intake and Output





12/27/18 12/27/18 12/28/18





1515:00


23:00


07:00





IntakeIntake Total


1160 ml


480 ml





BalanceBalance


1160 ml


480 ml














Exam


Constitutional:  alert, oriented


Head:  normocephalic


Eyes:  icteric, other (pallor)


Neck:  supple, non-tender


Respiratory:  clear to auscultation


Cardiovascular:  regular rate and rhythm


Gastrointestinal:  soft, non-tender





Medications


Medications





Current Medications


IV Flush (NS 3 ml) 3 ml PER PROTOCOL IV ;  Start 12/23/18 at 23:30


Ondansetron HCl (Zofran Inj) 4 mg Q6H  PRN IV NAUSEA AND/OR VOMITING;  Start 


12/23/18 at 23:30


Acetaminophen (Tylenol Tab) 650 mg Q6H  PRN PO PAIN LEVEL 1-3 OR FEVER;  Start 


12/23/18 at 23:30


Acetaminophen/ Hydrocodone Bitart (Norco (5/325)) 1 tab Q6H  PRN PO PAIN LEVEL 


4-6 Last administered on 12/28/18at 07:56; Admin Dose 1 TAB;  Start 12/23/18 at 


23:30


Acetaminophen/ Hydrocodone Bitart (Norco (5/325)) 2 tab Q6H  PRN PO PAIN LEVEL 


7-10;  Start 12/23/18 at 23:30


Pantoprazole (Protonix Tab) 40 mg DAILY@06 PO  Last administered on 12/28/18at 


05:35; Admin Dose 40 MG;  Start 12/24/18 at 06:00


Benazepril HCl (Lotensin) 10 mg DAILY PO  Last administered on 12/28/18at 08:37;


Admin Dose 10 MG;  Start 12/24/18 at 09:00


Miscellaneous Information 1 ea NOTE XX ;  Start 12/24/18 at 02:30


Glucose (Glutose) 15 gm Q15M  PRN PO DECREASED GLUCOSE;  Start 12/24/18 at 02:30


Glucose (Glutose) 22.5 gm Q15M  PRN PO DECREASED GLUCOSE;  Start 12/24/18 at 


02:30


Dextrose (D50w Syringe) 25 ml Q15M  PRN IV DECREASED GLUCOSE;  Start 12/24/18 at


02:30


Dextrose (D50w Syringe) 50 ml Q15M  PRN IV DECREASED GLUCOSE;  Start 12/24/18 at


02:30


Glucagon (Glucagen) 1 mg Q15M  PRN IM DECREASED GLUCOSE;  Start 12/24/18 at 


02:30


Glucose (Glutose) 15 gm Q15M  PRN BUCCAL DECREASED GLUCOSE;  Start 12/24/18 at 


02:30


Diphenhydramine HCl (Benadryl) 25 mg Q6H  PRN IV itching Last administered on 


12/24/18at 07:01; Admin Dose 25 MG;  Start 12/24/18 at 07:00


Ursodiol (Actigall) 300 mg TID PO  Last administered on 12/28/18at 08:33; Admin 


Dose 300 MG;  Start 12/24/18 at 09:00


Insulin Aspart (Novolog Insulin Pen) NOVOLOG *MILD* ALGORITHM WITH MEALS  


BEDTIME SC  Last administered on 12/28/18at 08:55; Admin Dose 2 UNIT;  Start 


12/24/18 at 21:00


Hydralazine HCl (Apresoline) 10 mg Q6H  PRN IV SBP ABOVE 170;  Start 12/24/18 at


21:00


Neomycin/ Polymyxin/ Bacitracin (Neosporin Topical Oint) 1 applic BID TOP  Last 


administered on 12/28/18at 08:39; Admin Dose 1 APPLIC;  Start 12/26/18 at 21:00


Insulin Aspart (Novolog Insulin Pen) 15 unit WITH  MEALS SC  Last administered 


on 12/28/18at 08:54; Admin Dose 15 UNIT;  Start 12/27/18 at 12:00


Insulin Glargine (Lantus) 30 units DAILY@2000 SC  Last administered on 


12/27/18at 20:18; Admin Dose 30 UNITS;  Start 12/27/18 at 20:00


Magnesium Hydroxide (Milk Of Mag) 30 ml DAILY  PRN PO CONSTIPATION Last 


administered on 12/28/18at 07:55; Admin Dose 30 ML;  Start 12/27/18 at 18:00











TEVIN MENARD MD            Dec 28, 2018 10:53

## 2018-12-28 NOTE — CONS
Date/Time of Note


Date/Time of Note


DATE: 12/27/18 


TIME: 11:47





Assessment/Plan


Late entry for visit 12/27/2018





Preoperative cardiac risk stratification


Jaundice


CAD with history of CABG


Hypertension





-Patient status post ERCP.  Denies any chest pain, shortness of breath, 


dizziness or palpitations at rest or with ambulation.  Blood pressure trend 


overall stable.  Continue antihypertensives as tolerated, restart aspirin if no 


plan for any further procedures.


Result Diagram:  


12/25/18 0622                                                                   


            12/27/18 1451





Results 24hrs





Laboratory Tests


Test
             12/27/18
11:56  12/27/18
14:51  12/27/18
17:02  12/27/18
20:15


Bedside Glucose           301  H                          222  H          223  H


Sodium Level                               135


Potassium Level                            4.0


Chloride Level                             96  L


Carbon Dioxide                              28


Level


Anion Gap                                   11


Blood Urea                                  13


Nitrogen


Creatinine                                0.65


Est Glomerular    
               > 60  
         
               



Filtrat


Rate
mL/min


Glucose Level                             247  H


Calcium Level                              9.3


Total Bilirubin                           9.8  H


Direct Bilirubin                         8.50  H


Indirect                                  1.3  H


Bilirubin


Aspartate Amino   
                        43  
  
               



Transf
(AST/SGOT


)


Alanine           
                        52  
  
               



Aminotransferase



(ALT/SGPT)


Alkaline                                  274  H


Phosphatase


Total Protein                              7.3


Albumin                                    3.5


Globulin                                 3.80  H


Albumin/Globulin                          0.92


Ratio


Test
             12/28/18
07:41  12/28/18
08:52  
               



Bedside Glucose            144             198








Consultation Date/Type/Reason


Admit Date/Time


Dec 23, 2018 at 19:55


Initial Consult Date





Type of Consult


cv





24 HR Interval Summary


Free Text/Dictation


Denies shortness of breath, chest pain or palpitations





Exam/Review of Systems


Vital Signs


Vitals





Vital Signs


  Date      Temp  Pulse  Resp  B/P (MAP)   Pulse Ox  O2          O2 Flow    FiO2


Time                                                 Delivery    Rate


  12/28/18  98.1     70    16      129/73        98  Room Air


     07:53                           (91)


  12/24/18                                                             2.0


     18:21








Intake and Output





12/27/18 12/27/18 12/28/18





1515:00


23:00


07:00





IntakeIntake Total


1160 ml


480 ml





BalanceBalance


1160 ml


480 ml














Exam


Jaundiced, family bedside


Constitutional:  alert, oriented


Head:  normocephalic


Respiratory:  clear to auscultation, normal air movement


Cardiovascular:  regular rate and rhythm, other (S1-S2 heard)


Gastrointestinal:  soft, non-tender, bowel sounds


Extremities:  other (No significant edema)





Medications


Medications





Current Medications


IV Flush (NS 3 ml) 3 ml PER PROTOCOL IV ;  Start 12/23/18 at 23:30


Ondansetron HCl (Zofran Inj) 4 mg Q6H  PRN IV NAUSEA AND/OR VOMITING;  Start 


12/23/18 at 23:30


Acetaminophen (Tylenol Tab) 650 mg Q6H  PRN PO PAIN LEVEL 1-3 OR FEVER;  Start 


12/23/18 at 23:30


Acetaminophen/ Hydrocodone Bitart (Norco (5/325)) 1 tab Q6H  PRN PO PAIN LEVEL 


4-6 Last administered on 12/28/18at 07:56; Admin Dose 1 TAB;  Start 12/23/18 at 


23:30


Acetaminophen/ Hydrocodone Bitart (Norco (5/325)) 2 tab Q6H  PRN PO PAIN LEVEL 


7-10;  Start 12/23/18 at 23:30


Pantoprazole (Protonix Tab) 40 mg DAILY@06 PO  Last administered on 12/28/18at 


05:35; Admin Dose 40 MG;  Start 12/24/18 at 06:00


Benazepril HCl (Lotensin) 10 mg DAILY PO  Last administered on 12/28/18at 08:37;


Admin Dose 10 MG;  Start 12/24/18 at 09:00


Miscellaneous Information 1 ea NOTE XX ;  Start 12/24/18 at 02:30


Glucose (Glutose) 15 gm Q15M  PRN PO DECREASED GLUCOSE;  Start 12/24/18 at 02:30


Glucose (Glutose) 22.5 gm Q15M  PRN PO DECREASED GLUCOSE;  Start 12/24/18 at 


02:30


Dextrose (D50w Syringe) 25 ml Q15M  PRN IV DECREASED GLUCOSE;  Start 12/24/18 at


02:30


Dextrose (D50w Syringe) 50 ml Q15M  PRN IV DECREASED GLUCOSE;  Start 12/24/18 at


02:30


Glucagon (Glucagen) 1 mg Q15M  PRN IM DECREASED GLUCOSE;  Start 12/24/18 at 


02:30


Glucose (Glutose) 15 gm Q15M  PRN BUCCAL DECREASED GLUCOSE;  Start 12/24/18 at 


02:30


Diphenhydramine HCl (Benadryl) 25 mg Q6H  PRN IV itching Last administered on 


12/24/18at 07:01; Admin Dose 25 MG;  Start 12/24/18 at 07:00


Ursodiol (Actigall) 300 mg TID PO  Last administered on 12/28/18at 08:33; Admin 


Dose 300 MG;  Start 12/24/18 at 09:00


Insulin Aspart (Novolog Insulin Pen) NOVOLOG *MILD* ALGORITHM WITH MEALS  


BEDTIME SC  Last administered on 12/28/18at 08:55; Admin Dose 2 UNIT;  Start 


12/24/18 at 21:00


Hydralazine HCl (Apresoline) 10 mg Q6H  PRN IV SBP ABOVE 170;  Start 12/24/18 at


21:00


Neomycin/ Polymyxin/ Bacitracin (Neosporin Topical Oint) 1 applic BID TOP  Last 


administered on 12/28/18at 08:39; Admin Dose 1 APPLIC;  Start 12/26/18 at 21:00


Insulin Aspart (Novolog Insulin Pen) 15 unit WITH  MEALS SC  Last administered o


n 12/28/18at 08:54; Admin Dose 15 UNIT;  Start 12/27/18 at 12:00


Insulin Glargine (Lantus) 30 units DAILY@2000 SC  Last administered on 


12/27/18at 20:18; Admin Dose 30 UNITS;  Start 12/27/18 at 20:00


Magnesium Hydroxide (Milk Of Mag) 30 ml DAILY  PRN PO CONSTIPATION Last 


administered on 12/28/18at 07:55; Admin Dose 30 ML;  Start 12/27/18 at 18:00











Nilo Jiang DO           Dec 28, 2018 11:49

## 2018-12-28 NOTE — PN
DATE:  12/27/2018

 

 

The patient at this time complains of little abdominal discomfort, but he has not had a bowel movemen
t.  Milk of Magnesia was ordered by me.  The summary is that patient has obstructive jaundice due to 
mass in the region of the siria hepatis.  Whether it is gallbladder carcinoma or cholangiocarcinoma i
s not very well be determined at this time.  He has a very high CA 19-9 level of 4600.  The patient w
as by Dr. Kam from hematology/oncology standpoint.  There was a repeat MRI ordered by Dr. Kam
 which showed evidence of no significant interval change compared to the prior examination indicating
 that there is irregular soft tissue mass or thickening of the gallbladder extending to the hepatic h
ilum segment 4 surrounding the common bile duct.  Evaluation was limited.  There is a stable moderate
 intrahepatic dilatation likely due to obstruction by the mass.  He also has got cholelithiasis.

 

PHYSICAL EXAMINATION:  At this time, examination of the abdomen is unremarkable.

 

LABORATORY WORKUP:  WBC count 6900.  His bilirubin is in the range of 9.8.  It was 9.3 before and bef
ore that it was 10.6.  Before that, it was 9.4, so essentially it seems to be unchanged.  The cytolog
y from the common bile duct that was obtained with ERCP showed evidence of ____ suspicious for malign
freddie and ampullary biopsy was unremarkable for malignancy.

 

CLINICAL IMPRESSION:  The patient has extensive mass near the siria hepatis.  Whether they are origin
ating from the gallbladder or common bile duct is not clear, but the distal common bile duct particul
kalli in the mid half of the distal common bile duct.  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
giocarcinoma and whether this came from the gallbladder or it is originating from the common bile melanie
t is not very clear.  The bilirubin is not concerning significantly because of the extensive tumor in
volving the multiple bile ducts.  The 2 stents, one in the right and one in left hepatic duct may not
 be enough and more stents may not be possible ____ because the patient seems to have extensive tumor
 as noted in the multiple imaging studies.

 

PLAN:  The patient is being seen by Dr. Kam for further management and he even recommended that t
he patient would be better served at a tertiary care center that needs to be seen.

 

 

Dictated By: ESTHER SEGOVIA/PERLA

DD:    12/27/2018 18:08:07

DT:    12/27/2018 18:45:12

Conf#: [id number]

DID#:  [id number]

CC: EDDIE ROCHA; REJI EVANS MD;*[facility]*

## 2018-12-28 NOTE — NUR
Diabetes Education Referral:



Thank you for the referral.



R: Upon discharge, pt's insurance covers Basaglar and Admelog. Consider continuing 
Linagliptin. 



HbA1c 10.1%; 80kg; BMI 27; Cr 0.60; Admitting serum glucose 246 mg/dl

Pt stated he has had T2DM for about 7-8 years. With the use of daughter (Dominique) translating, 
pt stated after he was diagnosed he used insulin for a couple months then transitioned to 
pills. Prior to this admission pt was taking Amaryl 4 mg daily, Linagliptin, and Metformin. 



With the use of handouts discussed at length with patient and family at bedside how diabetes 
works in the body, where the glucose comes from, how food breaks down into glucose, and how 
different insulins (Basaglar and Admelog) work. Reviewed glucose targets (pre and post 
meal), reviewed the different food groups and when/how to manage glucose by adjusting the 
carbohydrates and proteins. Discussed how an increase in activity can assist in lowering 
glucose levels. Reviewed signs and symptoms of hypoglycemia and treatment. After eligibility 
check, pt provided a Freestyle Lite glucometer. Reviewed how to use glucometer, change date 
and time, load lancet devices and how to correctly complete a glucose reading. Pt completed 
a finger stick, current result 234 mg/dl. Reviewed the correct injection technique using 
demo insulin pen and Meliza pen needle. Discussed timing of the insulins, locations of 
administration, to rotate the administration site, proper storage of the insulin, and to 
never inject insulin cold. Pt declined a return demonstration. Resources were given to pt 
for future follow up. All questions answered.

## 2019-02-14 ENCOUNTER — HOSPITAL ENCOUNTER (INPATIENT)
Dept: HOSPITAL 10 - 2NE | Age: 64
LOS: 6 days | Discharge: HOME | DRG: 872 | End: 2019-02-20
Attending: INTERNAL MEDICINE | Admitting: INTERNAL MEDICINE
Payer: COMMERCIAL

## 2019-02-14 ENCOUNTER — HOSPITAL ENCOUNTER (INPATIENT)
Dept: HOSPITAL 91 - 2NE | Age: 64
LOS: 6 days | Discharge: HOME | DRG: 872 | End: 2019-02-20
Payer: COMMERCIAL

## 2019-02-14 VITALS — HEART RATE: 61 BPM | SYSTOLIC BLOOD PRESSURE: 104 MMHG | RESPIRATION RATE: 18 BRPM | DIASTOLIC BLOOD PRESSURE: 59 MMHG

## 2019-02-14 VITALS
HEIGHT: 66 IN | BODY MASS INDEX: 24.8 KG/M2 | HEIGHT: 66 IN | BODY MASS INDEX: 24.8 KG/M2 | WEIGHT: 154.32 LBS | WEIGHT: 154.32 LBS

## 2019-02-14 DIAGNOSIS — K20.9: ICD-10-CM

## 2019-02-14 DIAGNOSIS — C22.1: ICD-10-CM

## 2019-02-14 DIAGNOSIS — C78.7: ICD-10-CM

## 2019-02-14 DIAGNOSIS — I25.10: ICD-10-CM

## 2019-02-14 DIAGNOSIS — K29.30: ICD-10-CM

## 2019-02-14 DIAGNOSIS — E78.5: ICD-10-CM

## 2019-02-14 DIAGNOSIS — K31.1: ICD-10-CM

## 2019-02-14 DIAGNOSIS — K31.84: ICD-10-CM

## 2019-02-14 DIAGNOSIS — A41.59: Primary | ICD-10-CM

## 2019-02-14 DIAGNOSIS — I10: ICD-10-CM

## 2019-02-14 DIAGNOSIS — Z95.1: ICD-10-CM

## 2019-02-14 DIAGNOSIS — E11.43: ICD-10-CM

## 2019-02-14 PROCEDURE — 85610 PROTHROMBIN TIME: CPT

## 2019-02-14 PROCEDURE — 85730 THROMBOPLASTIN TIME PARTIAL: CPT

## 2019-02-14 PROCEDURE — 78264 GASTRIC EMPTYING IMG STUDY: CPT

## 2019-02-14 PROCEDURE — 84100 ASSAY OF PHOSPHORUS: CPT

## 2019-02-14 PROCEDURE — 74176 CT ABD & PELVIS W/O CONTRAST: CPT

## 2019-02-14 PROCEDURE — 74018 RADEX ABDOMEN 1 VIEW: CPT

## 2019-02-14 PROCEDURE — 74240 X-RAY XM UPR GI TRC 1CNTRST: CPT

## 2019-02-14 PROCEDURE — 80053 COMPREHEN METABOLIC PANEL: CPT

## 2019-02-14 PROCEDURE — 80048 BASIC METABOLIC PNL TOTAL CA: CPT

## 2019-02-14 PROCEDURE — 85025 COMPLETE CBC W/AUTO DIFF WBC: CPT

## 2019-02-14 PROCEDURE — 87040 BLOOD CULTURE FOR BACTERIA: CPT

## 2019-02-14 PROCEDURE — 82962 GLUCOSE BLOOD TEST: CPT

## 2019-02-14 PROCEDURE — 83735 ASSAY OF MAGNESIUM: CPT

## 2019-02-14 PROCEDURE — A9541 TC99M SULFUR COLLOID: HCPCS

## 2019-02-14 RX ADMIN — HEPARIN SODIUM 1 UNIT: 5000 INJECTION, SOLUTION INTRAVENOUS; SUBCUTANEOUS at 20:46

## 2019-02-14 RX ADMIN — INSULIN ASPART 1 UNIT: 100 INJECTION, SOLUTION INTRAVENOUS; SUBCUTANEOUS at 21:00

## 2019-02-14 RX ADMIN — HEPARIN SODIUM SCH UNIT: 5000 INJECTION, SOLUTION INTRAVENOUS; SUBCUTANEOUS at 20:46

## 2019-02-14 RX ADMIN — THIAMINE HYDROCHLORIDE 1 MLS/HR: 100 INJECTION, SOLUTION INTRAMUSCULAR; INTRAVENOUS at 18:55

## 2019-02-14 RX ADMIN — FOLIC ACID SCH MLS/HR: 5 INJECTION, SOLUTION INTRAMUSCULAR; INTRAVENOUS; SUBCUTANEOUS at 18:55

## 2019-02-15 VITALS — SYSTOLIC BLOOD PRESSURE: 120 MMHG | DIASTOLIC BLOOD PRESSURE: 63 MMHG | RESPIRATION RATE: 18 BRPM | HEART RATE: 63 BPM

## 2019-02-15 VITALS — DIASTOLIC BLOOD PRESSURE: 62 MMHG | RESPIRATION RATE: 17 BRPM | SYSTOLIC BLOOD PRESSURE: 120 MMHG | HEART RATE: 59 BPM

## 2019-02-15 VITALS — DIASTOLIC BLOOD PRESSURE: 58 MMHG | RESPIRATION RATE: 18 BRPM | SYSTOLIC BLOOD PRESSURE: 111 MMHG | HEART RATE: 83 BPM

## 2019-02-15 VITALS — HEART RATE: 65 BPM | SYSTOLIC BLOOD PRESSURE: 115 MMHG | RESPIRATION RATE: 19 BRPM | DIASTOLIC BLOOD PRESSURE: 63 MMHG

## 2019-02-15 LAB
ABNORMAL IP MESSAGE: 1
ADD MAN DIFF?: NO
ADD MAN DIFF?: YES
ALANINE AMINOTRANSFERASE: 22 IU/L (ref 13–69)
ALBUMIN/GLOBULIN RATIO: 1
ALBUMIN: 3.1 G/DL (ref 3.3–4.9)
ALKALINE PHOSPHATASE: 103 IU/L (ref 42–121)
ANION GAP: 7 (ref 5–13)
ANION GAP: 9 (ref 5–13)
ANISOCYTOSIS: (no result) (ref 0–0)
ASPARTATE AMINO TRANSFERASE: 37 IU/L (ref 15–46)
BAND NEUTROPHILS #M: 0.4 10^3/UL (ref 0–0.6)
BAND NEUTROPHILS % (M): 8 % (ref 0–4)
BASOPHIL #: 0 10^3/UL (ref 0–0.1)
BASOPHILS %: 0.4 % (ref 0–2)
BILIRUBIN,DIRECT: 0 MG/DL (ref 0–0.2)
BILIRUBIN,TOTAL: 0.6 MG/DL (ref 0.2–1.3)
BLOOD UREA NITROGEN: 9 MG/DL (ref 7–20)
BLOOD UREA NITROGEN: 9 MG/DL (ref 7–20)
BURR CELLS: (no result) (ref 0–0)
CALCIUM: 8.6 MG/DL (ref 8.4–10.2)
CALCIUM: 8.6 MG/DL (ref 8.4–10.2)
CARBON DIOXIDE: 24 MMOL/L (ref 21–31)
CARBON DIOXIDE: 26 MMOL/L (ref 21–31)
CHLORIDE: 102 MMOL/L (ref 97–110)
CHLORIDE: 104 MMOL/L (ref 97–110)
CREATININE: 0.61 MG/DL (ref 0.61–1.24)
CREATININE: 0.64 MG/DL (ref 0.61–1.24)
EOSINOPHILS #: 0 10^3/UL (ref 0–0.5)
EOSINOPHILS % (M): 2 % (ref 0–7)
EOSINOPHILS %: 0.2 % (ref 0–7)
ERYTHROBLAST% (NRBC) (M): 1 % (ref 0–0)
GIANT THROMBO% (M): 1 % (ref 0–0)
GLOBULIN: 3.1 G/DL (ref 1.3–3.2)
GLUCOSE: 109 MG/DL (ref 70–220)
GLUCOSE: 120 MG/DL (ref 70–220)
HEMATOCRIT: 27.7 % (ref 42–52)
HEMATOCRIT: 28.8 % (ref 42–52)
HEMOGLOBIN: 9.1 G/DL (ref 14–18)
HEMOGLOBIN: 9.6 G/DL (ref 14–18)
IMMATURE GRANS #M: 0.02 10^3/UL (ref 0–0.03)
IMMATURE GRANS #M: 0.03 10^3/UL (ref 0–0.03)
IMMATURE GRANS % (M): 0.4 % (ref 0–0.43)
IMMATURE GRANS % (M): 0.6 % (ref 0–0.43)
LYMPHOCYTES #: 0.5 10^3/UL (ref 0.8–2.9)
LYMPHOCYTES #M: 1.1 10^3/UL (ref 0.8–2.9)
LYMPHOCYTES % (M): 22 % (ref 15–51)
LYMPHOCYTES %: 11 % (ref 15–51)
MAGNESIUM: 1.8 MG/DL (ref 1.7–2.5)
MEAN CORPUSCULAR HEMOGLOBIN: 29.5 PG (ref 29–33)
MEAN CORPUSCULAR HEMOGLOBIN: 29.7 PG (ref 29–33)
MEAN CORPUSCULAR HGB CONC: 32.9 G/DL (ref 32–37)
MEAN CORPUSCULAR HGB CONC: 33.3 G/DL (ref 32–37)
MEAN CORPUSCULAR VOLUME: 89.2 FL (ref 82–101)
MEAN CORPUSCULAR VOLUME: 89.9 FL (ref 82–101)
MEAN PLATELET VOLUME: 9.4 FL (ref 7.4–10.4)
MEAN PLATELET VOLUME: 9.8 FL (ref 7.4–10.4)
MICROCYTOSIS: (no result) (ref 0–0)
MONOCYTE #: 0.5 10^3/UL (ref 0.3–0.9)
MONOCYTE #M: 0.4 10^3/UL (ref 0.3–0.9)
MONOCYTES % (M): 9 % (ref 0–11)
MONOCYTES %: 9.4 % (ref 0–11)
NEUTROPHIL #: 3.9 10^3/UL (ref 1.6–7.5)
NEUTROPHILS %: 78.6 % (ref 39–77)
NUCLEATED RED BLOOD CELLS #: 0 10^3/UL (ref 0–0)
NUCLEATED RED BLOOD CELLS%: 0 /100WBC (ref 0–0)
NUCLEATED RED BLOOD CELLS%: 0 /100WBC (ref 0–0)
PHOSPHORUS: 4.2 MG/DL (ref 2.5–4.9)
PLATELET COUNT: 302 10^3/UL (ref 140–415)
PLATELET COUNT: 311 10^3/UL (ref 140–415)
PLATELET ESTIMATE: NORMAL
POIKILOCYTOSIS: (no result) (ref 0–0)
POLYCHROMASIA: (no result) (ref 0–0)
POSITIVE DIFF: (no result)
POSITIVE DIFF: (no result)
POTASSIUM: 3.5 MMOL/L (ref 3.5–5.1)
POTASSIUM: 3.6 MMOL/L (ref 3.5–5.1)
RED BLOOD COUNT: 3.08 10^6/UL (ref 4.7–6.1)
RED BLOOD COUNT: 3.23 10^6/UL (ref 4.7–6.1)
RED CELL DISTRIBUTION WIDTH: 16.4 % (ref 11.5–14.5)
RED CELL DISTRIBUTION WIDTH: 16.4 % (ref 11.5–14.5)
SEG NEUT #M: 3 10^3/UL (ref 1.6–7.5)
SEGMENTED NEUTROPHILS (M) %: 59 % (ref 39–77)
SMUDGE%M: 2 % (ref 0–0)
SODIUM: 135 MMOL/L (ref 135–144)
SODIUM: 137 MMOL/L (ref 135–144)
TOTAL PROTEIN: 6.2 G/DL (ref 6.1–8.1)
WHITE BLOOD COUNT: 4.9 10^3/UL (ref 4.8–10.8)
WHITE BLOOD COUNT: 5.1 10^3/UL (ref 4.8–10.8)

## 2019-02-15 RX ADMIN — HEPARIN SODIUM 1 UNIT: 5000 INJECTION, SOLUTION INTRAVENOUS; SUBCUTANEOUS at 08:26

## 2019-02-15 RX ADMIN — HEPARIN SODIUM 1 UNIT: 5000 INJECTION, SOLUTION INTRAVENOUS; SUBCUTANEOUS at 22:01

## 2019-02-15 RX ADMIN — THIAMINE HYDROCHLORIDE 1 MLS/HR: 100 INJECTION, SOLUTION INTRAMUSCULAR; INTRAVENOUS at 14:55

## 2019-02-15 RX ADMIN — THIAMINE HYDROCHLORIDE 1 MLS/HR: 100 INJECTION, SOLUTION INTRAMUSCULAR; INTRAVENOUS at 19:50

## 2019-02-15 RX ADMIN — FOLIC ACID SCH MLS/HR: 5 INJECTION, SOLUTION INTRAMUSCULAR; INTRAVENOUS; SUBCUTANEOUS at 08:23

## 2019-02-15 RX ADMIN — INSULIN ASPART 1 UNIT: 100 INJECTION, SOLUTION INTRAVENOUS; SUBCUTANEOUS at 17:00

## 2019-02-15 RX ADMIN — PIPERACILLIN SODIUM AND TAZOBACTAM SODIUM SCH MLS/HR: 3; .375 INJECTION, POWDER, LYOPHILIZED, FOR SOLUTION INTRAVENOUS at 06:17

## 2019-02-15 RX ADMIN — PIPERACILLIN SODIUM AND TAZOBACTAM SODIUM 1 MLS/HR: 3; .375 INJECTION, POWDER, LYOPHILIZED, FOR SOLUTION INTRAVENOUS at 00:30

## 2019-02-15 RX ADMIN — FOLIC ACID SCH MLS/HR: 5 INJECTION, SOLUTION INTRAMUSCULAR; INTRAVENOUS; SUBCUTANEOUS at 04:55

## 2019-02-15 RX ADMIN — INSULIN ASPART 1 UNIT: 100 INJECTION, SOLUTION INTRAVENOUS; SUBCUTANEOUS at 21:00

## 2019-02-15 RX ADMIN — HEPARIN SODIUM SCH UNIT: 5000 INJECTION, SOLUTION INTRAVENOUS; SUBCUTANEOUS at 22:01

## 2019-02-15 RX ADMIN — INSULIN ASPART 1 UNIT: 100 INJECTION, SOLUTION INTRAVENOUS; SUBCUTANEOUS at 13:00

## 2019-02-15 RX ADMIN — PIPERACILLIN SODIUM AND TAZOBACTAM SODIUM SCH MLS/HR: 3; .375 INJECTION, POWDER, LYOPHILIZED, FOR SOLUTION INTRAVENOUS at 00:30

## 2019-02-15 RX ADMIN — INSULIN ASPART 1 UNIT: 100 INJECTION, SOLUTION INTRAVENOUS; SUBCUTANEOUS at 08:25

## 2019-02-15 RX ADMIN — PIPERACILLIN SODIUM AND TAZOBACTAM SODIUM 1 MLS/HR: 3; .375 INJECTION, POWDER, LYOPHILIZED, FOR SOLUTION INTRAVENOUS at 06:17

## 2019-02-15 RX ADMIN — HEPARIN SODIUM SCH UNIT: 5000 INJECTION, SOLUTION INTRAVENOUS; SUBCUTANEOUS at 08:26

## 2019-02-15 RX ADMIN — PIPERACILLIN SODIUM AND TAZOBACTAM SODIUM SCH MLS/HR: 3; .375 INJECTION, POWDER, LYOPHILIZED, FOR SOLUTION INTRAVENOUS at 13:00

## 2019-02-15 RX ADMIN — FOLIC ACID SCH MLS/HR: 5 INJECTION, SOLUTION INTRAMUSCULAR; INTRAVENOUS; SUBCUTANEOUS at 14:55

## 2019-02-15 RX ADMIN — PIPERACILLIN SODIUM AND TAZOBACTAM SODIUM 1 MLS/HR: 3; .375 INJECTION, POWDER, LYOPHILIZED, FOR SOLUTION INTRAVENOUS at 13:00

## 2019-02-15 RX ADMIN — PIPERACILLIN SODIUM AND TAZOBACTAM SODIUM SCH MLS/HR: 3; .375 INJECTION, POWDER, LYOPHILIZED, FOR SOLUTION INTRAVENOUS at 17:38

## 2019-02-15 RX ADMIN — THIAMINE HYDROCHLORIDE 1 MLS/HR: 100 INJECTION, SOLUTION INTRAMUSCULAR; INTRAVENOUS at 08:23

## 2019-02-15 RX ADMIN — IOHEXOL 1 ML: 300 INJECTION, SOLUTION INTRAVENOUS at 19:51

## 2019-02-15 RX ADMIN — PIPERACILLIN SODIUM AND TAZOBACTAM SODIUM 1 MLS/HR: 3; .375 INJECTION, POWDER, LYOPHILIZED, FOR SOLUTION INTRAVENOUS at 17:38

## 2019-02-15 RX ADMIN — INSULIN ASPART 1 UNIT: 100 INJECTION, SOLUTION INTRAVENOUS; SUBCUTANEOUS at 05:00

## 2019-02-15 RX ADMIN — THIAMINE HYDROCHLORIDE 1 MLS/HR: 100 INJECTION, SOLUTION INTRAMUSCULAR; INTRAVENOUS at 04:55

## 2019-02-15 RX ADMIN — INSULIN ASPART 1 UNIT: 100 INJECTION, SOLUTION INTRAVENOUS; SUBCUTANEOUS at 01:00

## 2019-02-15 RX ADMIN — FOLIC ACID SCH MLS/HR: 5 INJECTION, SOLUTION INTRAMUSCULAR; INTRAVENOUS; SUBCUTANEOUS at 19:50

## 2019-02-15 NOTE — CONS
DATE OF ADMISSION: 02/14/2019

DATE OF CONSULTATION:  02/15/2019

 

 

 

TYPE OF CONSULTATION:  Infectious disease.

 

REASON FOR CONSULTATION:  Antibiotic management.

 

HISTORY OF PRESENT ILLNESS:  Duane Salazar is a 63-year-old [place] American male with a recent 
medical history of recently diagnosed cholangiocarcinoma, who presents to [facility] as a tr
ansfer from an outside facility where he presented with generalized weakness, fever and inability to 
tolerate p.o. intake as he would vomit after eating.  He has been getting chemotherapy from Dr. Hardeep zavaleta.  Currently, he is not able to tolerate oral intake.  He has fairly persistent abdominal pain. 
 His new complaint is generalized weakness with fever.  The patient feels less feverish in the emerge
ncy room, but still has abdominal pain and difficulty in tolerating oral intake.

 

PAST MEDICAL HISTORY:  Operations:  Status post coronary artery bypass surgery.

 

FAMILY HISTORY:  Noncontributory.

 

SOCIAL HISTORY:  He does not smoke, drink or abuse drugs.

 

ALLERGIES:  NONE TO PENICILLIN, SULFA OR FOODS.

 

MEDICATIONS:  Per chart.

 

REVIEW OF SYSTEMS:  Noncontributory.

 

PHYSICAL EXAMINATION:

GENERAL:  The patient is lying in bed, appropriate, oriented x3, in no acute distress.

VITAL SIGNS:  Stable.  He is afebrile.

SKIN:  Without generalized rash.

HEENT:  Within normal limits.

NECK:  Supple.

LYMPH NODES:  None palpable.

CHEST:  Decreased breath sounds at the bases.

HEART:  Without murmur or gallop.

ABDOMEN:  Soft, tender to palpation without rebound.  Bowel sounds are heard.

EXTREMITIES:  Without cyanosis, clubbing or edema.

RECTAL AND GENITAL:  Deferred.

NEUROLOGICAL:  No new skin lesions.

 

ANCILLARY LABORATORY DATA:  The patient presented with white count of 4.9 and then _____, H and H of 
9.1 and 27.7, platelet count 302,000 with 59 polys, 8 bands.  BUN and creatinine is 9/0.64, glucose o
f 120.

 

DIAGNOSTIC DATA:  There is no recent imaging studies, the last one being 12/26/2018.

 

IMPRESSION AND PLAN:  The patient has cholangiocarcinoma with biliary obstruction, status post ERCP w
ith stent placed here.  Repeat obstruction and stent replaced at [facility] last week.  He received 2 doses
 of chemotherapy.  He now returns with inability to tolerate p.o. intake.  The patient is also septic
.  His blood cultures were not done here, but according to Dr. Patterson, the patient has gram-negative
 bacteremia.  He was started on Zosyn.  He has an NG tube in place today.  He appears comfortable.  RAMSEY Maynard was consulted for guidance in GI, cholangiocarcinoma with Dr. Mead was contacted and 
for gram-negative dwayne bacteremia, ID was consulted and diabetes mellitus.  We will continue him on th
is current therapy.  We will be happy to follow along in his care.  I want to thank the hospitalist f
or asking us to see this theodora gentleman in consultation.

 

 

Dictated By: JERROLD DREYER MD JD/NTS

DD:    02/15/2019 17:02:27

DT:    02/15/2019 18:18:17

Conf#: [id number]

DID#:  [id number]

CC: DEVON FARAH MD;*End*

## 2019-02-15 NOTE — PN
Date/Time of Note


Date/Time of Note


DATE: 2/15/19 


TIME: 12:54





Assessment/Plan


VTE Prophylaxis


Risk score (from Ns)>0 risk:  4


SCD applied (from Ns):  Yes


Pharmacological prophylaxis:  heparin





Lines/Catheters


IV Catheter Type (from [facility]):  Peripheral IV


Urinary Cath still in place:  No





Assessment/Plan


Hospital Course


62 yo male with h/o cholangiocarcinoma with biliary obstruction.  s/p ERCP with 


stent placed here.  Then repeat obstruction and stent replaced at [facility] last 


week.  Has received 2 doses chemo.  Now returns with inability to tolerate PO 


and sepsis.  Found to have gastric obstruction from tumor burden.  Also sepsis 


from GNR bacteremia





Gastric outlet obstruction


- Continue IV fluids for now


- Dr Maynard consulted for guidance from GI





Cholangiocarcinoma:


- Dr Guido contacted





GNR bacteremia with sepsis:


- Continue zoysn.  ID consultation


- Follow cultures





DMII:


- insulin scale





Discharge when stable


Result Diagram:  


2/15/19 0524                                                                    


           2/15/19 0524





Results 24hrs





Laboratory Tests


Test
                 2/14/19
21:38  2/15/19
00:24  2/15/19
00:25  2/15/19
00:56


Bedside Glucose               108                                          117


White Blood Count                           4.9  #


Red Blood Count                           3.23  #L


Hemoglobin                                 9.6  #L


Hematocrit                                 28.8  L


Mean Corpuscular                            89.2


Volume


Mean Corpuscular                            29.7


Hemoglobin


Mean Corpuscular      
                    33.3  
  
              



Hemoglobin
Concent


Red Cell                                   16.4  H


Distribution Width


Platelet Count                               311


Mean Platelet Volume                         9.4


Immature                                   0.400


Granulocytes %


Neutrophils %                              78.6  H


Lymphocytes %                              11.0  L


Monocytes %                                  9.4


Eosinophils %                                0.2


Basophils %                                  0.4


Nucleated Red Blood                          0.0


Cells %


Immature                                   0.020


Granulocytes #


Neutrophils #                                3.9


Lymphocytes #                               0.5  L


Monocytes #                                  0.5


Eosinophils #                                0.0


Basophils #                                  0.0


Nucleated Red Blood                          0.0


Cells #


Sodium Level                                                135


Potassium Level                                             3.5


Chloride Level                                              102


Carbon Dioxide Level                                         24


Anion Gap                                                     9


Blood Urea Nitrogen                                           9


Creatinine                                                 0.61


Est Glomerular        
              
              > 60  
        



Filtrat Rate
mL/min


Glucose Level                                               109


Calcium Level                                               8.6


Total Bilirubin                                             0.6


Direct Bilirubin                                           0.00


Indirect Bilirubin                                          0.6


Aspartate Amino       
              
                      37  
  



Transf
(AST/SGOT)


Alanine               
              
                      22  
  



Aminotransferase
(AL


T/SGPT)


Alkaline Phosphatase                                        103


Total Protein                                               6.2


Albumin                                                    3.1  L


Globulin                                                   3.10


Albumin/Globulin                                           1.00


Ratio


Test
                 2/15/19
05:24  2/15/19
05:27  2/15/19
08:24  



White Blood Count             5.1


Red Blood Count             3.08  L


Hemoglobin                   9.1  L


Hematocrit                  27.7  L


Mean Corpuscular             89.9


Volume


Mean Corpuscular             29.5


Hemoglobin


Mean Corpuscular            32.9  
  
              
              



Hemoglobin
Concent


Red Cell                    16.4  H


Distribution Width


Platelet Count                302


Mean Platelet Volume          9.8


Immature                   0.600  H


Granulocytes %


Neutrophils %


Segmented                     59  
  
              
              



Neutrophils


%
(Manual)


Band Neutrophils %             8  H


(Manual)


Lymphocytes %


Lymphocytes %                  22


(Manual)


Monocytes %


Monocytes % (Manual)            9


Eosinophils %


Eosinophils %                   2


(Manual)


Basophils %


Nucleated Red Blood            1  H


Cells %


Immature                    0.030


Granulocytes #


Neutrophils #


Neutrophils #                 3.0


(Manual)


Band Neutrophils #            0.4


Lymphocytes (Manual)          1.1


Lymphocytes #


Monocytes #


Monocytes # (Manual)          0.4


Eosinophils #


Basophils #


Nucleated Red Blood


Cells #


Platelet Estimate     NORMAL


Giant Platelets                1  H


Polychromasia                  3+


Poikilocytosis                 1+


Anisocytosis                   2+


Microcytosis                   2+


Sodium Level                  137


Potassium Level               3.6


Chloride Level                104


Carbon Dioxide Level           26


Anion Gap                       7


Blood Urea Nitrogen             9


Creatinine                   0.64


Est Glomerular        > 60  
        
              
              



Filtrat Rate
mL/min


Glucose Level                 120


Calcium Level                 8.6


Phosphorus Level              4.2


Magnesium Level               1.8


Bedside Glucose                              120            123








Subjective


24 Hr Interval Summary


Free Text/Dictation


Pain controlled


Sepsis resolved





Exam/Review of Systems


Exam


Vitals





Vital Signs


  Date      Temp  Pulse  Resp  B/P (MAP)   Pulse Ox  O2          O2 Flow    FiO2


Time                                                 Delivery    Rate


   2/15/19  98.4     65    19      115/63       100


     07:44                           (80)








Intake and Output





2/14/19


2/14/19


2/15/19





1515:00


23:00


07:00





OutputOutput Total


150 ml


750 ml





BalanceBalance


-150 ml


-750 ml











Exam


NG tube in place


Appears comfortable


pleasant and interactive


RRR


CTAB


Abd soft nt


Ext without edema





Results


Results 24hrs





Laboratory Tests


Test
                 2/14/19
21:38  2/15/19
00:24  2/15/19
00:25  2/15/19
00:56


Bedside Glucose               108                                          117


White Blood Count                           4.9  #


Red Blood Count                           3.23  #L


Hemoglobin                                 9.6  #L


Hematocrit                                 28.8  L


Mean Corpuscular                            89.2


Volume


Mean Corpuscular                            29.7


Hemoglobin


Mean Corpuscular      
                    33.3  
  
              



Hemoglobin
Concent


Red Cell                                   16.4  H


Distribution Width


Platelet Count                               311


Mean Platelet Volume                         9.4


Immature                                   0.400


Granulocytes %


Neutrophils %                              78.6  H


Lymphocytes %                              11.0  L


Monocytes %                                  9.4


Eosinophils %                                0.2


Basophils %                                  0.4


Nucleated Red Blood                          0.0


Cells %


Immature                                   0.020


Granulocytes #


Neutrophils #                                3.9


Lymphocytes #                               0.5  L


Monocytes #                                  0.5


Eosinophils #                                0.0


Basophils #                                  0.0


Nucleated Red Blood                          0.0


Cells #


Sodium Level                                                135


Potassium Level                                             3.5


Chloride Level                                              102


Carbon Dioxide Level                                         24


Anion Gap                                                     9


Blood Urea Nitrogen                                           9


Creatinine                                                 0.61


Est Glomerular        
              
              > 60  
        



Filtrat Rate
mL/min


Glucose Level                                               109


Calcium Level                                               8.6


Total Bilirubin                                             0.6


Direct Bilirubin                                           0.00


Indirect Bilirubin                                          0.6


Aspartate Amino       
              
                      37  
  



Transf
(AST/SGOT)


Alanine               
              
                      22  
  



Aminotransferase
(AL


T/SGPT)


Alkaline Phosphatase                                        103


Total Protein                                               6.2


Albumin                                                    3.1  L


Globulin                                                   3.10


Albumin/Globulin                                           1.00


Ratio


Test
                 2/15/19
05:24  2/15/19
05:27  2/15/19
08:24  



White Blood Count             5.1


Red Blood Count             3.08  L


Hemoglobin                   9.1  L


Hematocrit                  27.7  L


Mean Corpuscular             89.9


Volume


Mean Corpuscular             29.5


Hemoglobin


Mean Corpuscular            32.9  
  
              
              



Hemoglobin
Concent


Red Cell                    16.4  H


Distribution Width


Platelet Count                302


Mean Platelet Volume          9.8


Immature                   0.600  H


Granulocytes %


Neutrophils %


Segmented                     59  
  
              
              



Neutrophils


%
(Manual)


Band Neutrophils %             8  H


(Manual)


Lymphocytes %


Lymphocytes %                  22


(Manual)


Monocytes %


Monocytes % (Manual)            9


Eosinophils %


Eosinophils %                   2


(Manual)


Basophils %


Nucleated Red Blood            1  H


Cells %


Immature                    0.030


Granulocytes #


Neutrophils #


Neutrophils #                 3.0


(Manual)


Band Neutrophils #            0.4


Lymphocytes (Manual)          1.1


Lymphocytes #


Monocytes #


Monocytes # (Manual)          0.4


Eosinophils #


Basophils #


Nucleated Red Blood


Cells #


Platelet Estimate     NORMAL


Giant Platelets                1  H


Polychromasia                  3+


Poikilocytosis                 1+


Anisocytosis                   2+


Microcytosis                   2+


Sodium Level                  137


Potassium Level               3.6


Chloride Level                104


Carbon Dioxide Level           26


Anion Gap                       7


Blood Urea Nitrogen             9


Creatinine                   0.64


Est Glomerular        > 60  
        
              
              



Filtrat Rate
mL/min


Glucose Level                 120


Calcium Level                 8.6


Phosphorus Level              4.2


Magnesium Level               1.8


Bedside Glucose                              120            123








Medications


Medication





Current Medications


Sodium Chloride 1,000 ml @  100 mls/hr Q10H IV  Last administered on 2/15/19at 


08:23; Admin Dose 100 MLS/HR;  Start 2/14/19 at 18:55


IV Flush (NS 3 ml) 3 ml PER PROTOCOL IV ;  Start 2/14/19 at 19:00


Ondansetron HCl (Zofran Inj) 4 mg Q6H  PRN IV NAUSEA/VOMITING;  Start 2/14/19 at


19:00


Acetaminophen (Tylenol Tab) 650 mg Q6H  PRN PO .PAIN 1-3 OR TEMP;  Start 2/14/19


at 19:00


Acetaminophen/ Hydrocodone Bitart (Norco (5/325)) 1 tab Q6H  PRN PO .MOD PAIN 4-


6;  Start 2/14/19 at 19:00


Morphine Sulfate (morphine) 2 mg Q4H  PRN IV .SEVERE PAIN 7-10;  Start 2/14/19 


at 19:00


Heparin Sodium (Porcine) (Heparin  (5000 Units/1ml)) 5,000 unit Q12 SC  Last 


administered on 2/15/19at 08:26; Admin Dose 5,000 UNIT;  Start 2/14/19 at 21:00


Diagnostic Test (Pha) (Accu-Chek) 1 ea 02 XX ;  Start 2/15/19 at 02:00


Insulin Aspart (Novolog Insulin Pen) NOVOLOG *MILD* ALGORI... Q4 SC ;  Start 


2/14/19 at 21:00


Miscellaneous Information 1 ea NOTE XX ;  Start 2/14/19 at 19:30


Glucose (Glutose) 15 gm Q15M  PRN PO DECREASED GLUCOSE;  Start 2/14/19 at 19:30


Glucose (Glutose) 22.5 gm Q15M  PRN PO DECREASED GLUCOSE;  Start 2/14/19 at 


19:30


Dextrose (D50w Syringe) 25 ml Q15M  PRN IV DECREASED GLUCOSE;  Start 2/14/19 at 


19:30


Dextrose (D50w Syringe) 50 ml Q15M  PRN IV DECREASED GLUCOSE;  Start 2/14/19 at 


19:30


Glucagon (Glucagen) 1 mg Q15M  PRN IM DECREASED GLUCOSE;  Start 2/14/19 at 19:30


Glucose (Glutose) 15 gm Q15M  PRN BUCCAL DECREASED GLUCOSE;  Start 2/14/19 at 


19:30


Piperacillin Sod/ Tazobactam Sod 100 ml @  200 mls/hr Q6 IVPB  Last administered


on 2/15/19at 06:17; Admin Dose 200 MLS/HR;  Start 2/15/19 at 00:30











REJI EVANS MD          Feb 15, 2019 13:02

## 2019-02-15 NOTE — CONS
DATE OF ADMISSION: 02/14/2019

DATE OF CONSULTATION:  02/15/2019

 

 

 

REASON FOR CONSULTATION:  Evaluation advanced gallbladder/cholangiocarcinoma.

 

HISTORY OF PRESENT ILLNESS:  This is a 63-year-old [place] gentleman who back in September 2018, dev
eloped weakness, fatigue, right upper quadrant abdominal pain, jaundice, dark color urine, pale stool
s and weight loss around 5 to 10 pounds.  A workup was initiated, went to multiple hospitals includin
[facility] in [place], where he was diagnosed with a mass tumor in the biliary 
tree/gallbladder area.  Went to [facility], underwent ERCP and stent placement thereafter re
ferred to [facility] for which the stent was changed from plastic to metallic stent and biopsy was done nani
wed poorly differentiated adenocarcinoma, cholangiogram biliary.  The patient received 2 small cycles
 low dose modified cycles of gemcitabine and cisplatin in 01/2019.  With good response, the bilirubin
 went down from 19 down to 2.  The patient comes now to [facility] because of the d
ifficulty in eating, difficulties with gastric outlet obstruction.  Been evaluated by GI.  With nause
a, vomiting.  NG tube was placed at [facility] yesterday, they transferred to [facility].

 

PAST MEDICAL HISTORY:  

1.  Coronary artery disease.

2.  CABG.

3.  Diabetes.

4.  Hypertension.

5.  Hyperlipidemia.

 

SOCIAL HISTORY:  , has 2 daughters, 1 son.

 

FAMILY HISTORY:  Positive for cancer, uncle, lung cancer, sister melanoma.

 

PHYSICAL EXAMINATION:

HEENT:  Mild jaundice.

LUNGS:  Clear.

HEART:  Normal S1, S2.

ABDOMEN:  Distended.  Bowel sounds positive +1.  NG tube in.

 

ASSESSMENT AND PLAN:  A 63-year-old gentleman with inoperable biliary cholangiocarcinoma, poorly diff
erentiated, post Port-A-Cath.  Has cycle 1 gemcitabine platinum per protocol, now gastric outlet obst
ruction.  GI evaluation for probable feeding tube placement versus NG suction.  Surgical evaluation i
f needed in case of acute abdomen and bypass _____ GI.

 

 

Dictated By: NAVEED LEO/PERLA

DD:    02/15/2019 13:53:43

DT:    02/15/2019 14:26:13

Conf#: [id number]

DID#:  [id number]

CC: DEVON FARAH MD;*EndCC*

## 2019-02-15 NOTE — HP
Date/Time of Note


Date/Time of Note


DATE: 2/15/19 


TIME: 00:24





Assessment/Plan


VTE Prophylaxis


Risk score (from Nsg)>0 risk:  6


SCD applied (from Nsg):  Yes


Pharmacological prophylaxis:  other





Lines/Catheters


IV Catheter Type (from Nrsg):  Peripheral IV


Urinary Cath still in place:  No





Assessment/Plan


Hospital Course








Objective





Physical exam





General: Patient is laying in bed and answers questions appropriately


Mentation: Patient is alert and oriented 4,


Head: Normocephalic atraumatic


Eyes: EOMI, pupils reactive to light


Neck: Supple, nontender, midline


Respiratory: Clear to auscultation bilaterally


Cardiovascular: regular rate, no obvious murmurs


Gastrointestinal: Tender to palpation, bowel sounds heard.


Neurological: Moves all extremities spontaneously


Skin: No new skin lesions





Assessment and plan





Gastric outlet obstruction


-Likely secondary to worsening cholangiocarcinoma, seen on CT scan done at other


facility which states that mass appears to be enlarging and blocking the GD 


junction


-Day team physician to consult GI


-N.p.o.


-IV fluid


-Labs from outside facility are fairly unremarkable, do show a mild elevation in


total bilirubin of 2.5





Cholangiocarcinoma


-Seen by hematologist oncology in the outpatient setting, per patient has 


already received chemo twice


-Will defer to day team regarding if they want to consult patient's oncologist 


are not.





Fever


-As high as 101 at outside facility, unknown source, possibly viral or reactive.


 We will continue Zosyn for now, reassess in the a.m.





Diabetes mellitus


-Insulin sliding scale





Disposition


-Await for day team to consult GI and their recommendations regarding gastric 


outlet obstruction.


Results 24hrs





Laboratory Tests


                         Test
            2/14/19
21:38


                         Bedside Glucose          108








HPI/ROS


Admit Date/Time


Admit Date/Time


Feb 14, 2019 at 18:07





Hx of Present Illness


Patient is a male with a past medical history significant for recently diagnosed


cholangiocarcinoma who presents to [facility] as a transfer 


from outside facility where he originally presented for generalized weakness, 


fever and inability to tolerate p.o. intake as he would vomit after eating.  


Patient states that he has been getting chemotherapy from Dr. Coon, however


currently is not able to tolerate oral intake.  Patient states that he has 


fairly persistent abdominal pain however his new complaint is generalized 


weakness with fever.  Patient currently feels less feverish however still has 


the abdominal pain and inability to tolerate p.o.  Patient denies chest pain, 


shortness of breath, headache, leg pain.





PMH/Family/Social


Past Medical History


Medications





Current Medications


Sodium Chloride 1,000 ml @  100 mls/hr Q10H IV  Last administered on 2/14/19at 


18:55; Admin Dose 100 MLS/HR;  Start 2/14/19 at 18:55


IV Flush (NS 3 ml) 3 ml PER PROTOCOL IV ;  Start 2/14/19 at 19:00


Ondansetron HCl (Zofran Inj) 4 mg Q6H  PRN IV NAUSEA/VOMITING;  Start 2/14/19 at


19:00


Acetaminophen (Tylenol Tab) 650 mg Q6H  PRN PO .PAIN 1-3 OR TEMP;  Start 2/14/19


at 19:00


Acetaminophen/ Hydrocodone Bitart (Norco (5/325)) 1 tab Q6H  PRN PO .MOD PAIN 4-


6;  Start 2/14/19 at 19:00


Morphine Sulfate (morphine) 2 mg Q4H  PRN IV .SEVERE PAIN 7-10;  Start 2/14/19 


at 19:00


Heparin Sodium (Porcine) (Heparin  (5000 Units/1ml)) 5,000 unit Q12 SC  Last 


administered on 2/14/19at 20:46; Admin Dose 5,000 UNIT;  Start 2/14/19 at 21:00


Diagnostic Test (Pha) (Accu-Chek) 1 ea 02 XX ;  Start 2/15/19 at 02:00


Insulin Aspart (Novolog Insulin Pen) NOVOLOG *MILD* ALGORI... Q4 SC ;  Start 


2/14/19 at 21:00


Miscellaneous Information 1 ea NOTE XX ;  Start 2/14/19 at 19:30


Glucose (Glutose) 15 gm Q15M  PRN PO DECREASED GLUCOSE;  Start 2/14/19 at 19:30


Glucose (Glutose) 22.5 gm Q15M  PRN PO DECREASED GLUCOSE;  Start 2/14/19 at 


19:30


Dextrose (D50w Syringe) 25 ml Q15M  PRN IV DECREASED GLUCOSE;  Start 2/14/19 at 


19:30


Dextrose (D50w Syringe) 50 ml Q15M  PRN IV DECREASED GLUCOSE;  Start 2/14/19 at 


19:30


Glucagon (Glucagen) 1 mg Q15M  PRN IM DECREASED GLUCOSE;  Start 2/14/19 at 19:30


Glucose (Glutose) 15 gm Q15M  PRN BUCCAL DECREASED GLUCOSE;  Start 2/14/19 at 1


9:30


Piperacillin Sod/ Tazobactam Sod 100 ml @  200 mls/hr Q6 IVPB ;  Start 2/15/19 


at 00:30


Coded Allergies:  


     No Known Allergy (Verified , 5/23/16)





Past Surgical History


Past Surgical Hx:  coronary bypass surgery, other





Family History


Significant Family History:  no pertinent family hx





Social History


Smoking Status:  Never smoker





Exam/Review of Systems


Vital Signs


Vitals





Vital Signs


  Date      Temp  Pulse  Resp  B/P (MAP)   Pulse Ox  O2          O2 Flow    FiO2


Time                                                 Delivery    Rate


   2/14/19  98.1     61    18      104/59        98


     20:07                           (74)








Intake and Output





2/14/19


2/14/19


2/15/19





1515:00


23:00


07:00





OutputOutput Total


150 ml





BalanceBalance


-150 ml




















GOKUL GREEN                    Feb 15, 2019 00:24

## 2019-02-16 VITALS — RESPIRATION RATE: 17 BRPM | DIASTOLIC BLOOD PRESSURE: 65 MMHG | SYSTOLIC BLOOD PRESSURE: 140 MMHG | HEART RATE: 66 BPM

## 2019-02-16 VITALS — DIASTOLIC BLOOD PRESSURE: 61 MMHG | SYSTOLIC BLOOD PRESSURE: 123 MMHG | HEART RATE: 65 BPM | RESPIRATION RATE: 18 BRPM

## 2019-02-16 VITALS — SYSTOLIC BLOOD PRESSURE: 154 MMHG | DIASTOLIC BLOOD PRESSURE: 70 MMHG | HEART RATE: 79 BPM

## 2019-02-16 VITALS — HEART RATE: 62 BPM | SYSTOLIC BLOOD PRESSURE: 127 MMHG | RESPIRATION RATE: 20 BRPM | DIASTOLIC BLOOD PRESSURE: 61 MMHG

## 2019-02-16 VITALS — HEART RATE: 66 BPM | RESPIRATION RATE: 18 BRPM | SYSTOLIC BLOOD PRESSURE: 125 MMHG | DIASTOLIC BLOOD PRESSURE: 58 MMHG

## 2019-02-16 LAB
ADD MAN DIFF?: NO
ALANINE AMINOTRANSFERASE: 22 IU/L (ref 13–69)
ALBUMIN/GLOBULIN RATIO: 1
ALBUMIN: 2.8 G/DL (ref 3.3–4.9)
ALKALINE PHOSPHATASE: 83 IU/L (ref 42–121)
ANION GAP: 9 (ref 5–13)
ASPARTATE AMINO TRANSFERASE: 32 IU/L (ref 15–46)
BASOPHIL #: 0 10^3/UL (ref 0–0.1)
BASOPHILS %: 0.5 % (ref 0–2)
BILIRUBIN,DIRECT: 0 MG/DL (ref 0–0.2)
BILIRUBIN,TOTAL: 0.3 MG/DL (ref 0.2–1.3)
BLOOD UREA NITROGEN: 9 MG/DL (ref 7–20)
CALCIUM: 8.3 MG/DL (ref 8.4–10.2)
CARBON DIOXIDE: 22 MMOL/L (ref 21–31)
CHLORIDE: 106 MMOL/L (ref 97–110)
CREATININE: 0.58 MG/DL (ref 0.61–1.24)
EOSINOPHILS #: 0 10^3/UL (ref 0–0.5)
EOSINOPHILS %: 0.7 % (ref 0–7)
GLOBULIN: 2.8 G/DL (ref 1.3–3.2)
GLUCOSE: 81 MG/DL (ref 70–220)
HEMATOCRIT: 27.2 % (ref 42–52)
HEMOGLOBIN: 9.1 G/DL (ref 14–18)
IMMATURE GRANS #M: 0.02 10^3/UL (ref 0–0.03)
IMMATURE GRANS % (M): 0.4 % (ref 0–0.43)
LYMPHOCYTES #: 1.3 10^3/UL (ref 0.8–2.9)
LYMPHOCYTES %: 22.6 % (ref 15–51)
MEAN CORPUSCULAR HEMOGLOBIN: 29.8 PG (ref 29–33)
MEAN CORPUSCULAR HGB CONC: 33.5 G/DL (ref 32–37)
MEAN CORPUSCULAR VOLUME: 89.2 FL (ref 82–101)
MEAN PLATELET VOLUME: 9.3 FL (ref 7.4–10.4)
MONOCYTE #: 0.9 10^3/UL (ref 0.3–0.9)
MONOCYTES %: 16.5 % (ref 0–11)
NEUTROPHIL #: 3.3 10^3/UL (ref 1.6–7.5)
NEUTROPHILS %: 59.3 % (ref 39–77)
NUCLEATED RED BLOOD CELLS #: 0 10^3/UL (ref 0–0)
NUCLEATED RED BLOOD CELLS%: 0 /100WBC (ref 0–0)
PLATELET COUNT: 319 10^3/UL (ref 140–415)
POTASSIUM: 3.4 MMOL/L (ref 3.5–5.1)
RED BLOOD COUNT: 3.05 10^6/UL (ref 4.7–6.1)
RED CELL DISTRIBUTION WIDTH: 15.7 % (ref 11.5–14.5)
SODIUM: 137 MMOL/L (ref 135–144)
TOTAL PROTEIN: 5.6 G/DL (ref 6.1–8.1)
WHITE BLOOD COUNT: 5.6 10^3/UL (ref 4.8–10.8)

## 2019-02-16 RX ADMIN — FOLIC ACID SCH MLS/HR: 5 INJECTION, SOLUTION INTRAMUSCULAR; INTRAVENOUS; SUBCUTANEOUS at 07:27

## 2019-02-16 RX ADMIN — PIPERACILLIN SODIUM AND TAZOBACTAM SODIUM SCH MLS/HR: 3; .375 INJECTION, POWDER, LYOPHILIZED, FOR SOLUTION INTRAVENOUS at 23:40

## 2019-02-16 RX ADMIN — THIAMINE HYDROCHLORIDE 1 MLS/HR: 100 INJECTION, SOLUTION INTRAMUSCULAR; INTRAVENOUS at 00:55

## 2019-02-16 RX ADMIN — HEPARIN SODIUM 1 UNIT: 5000 INJECTION, SOLUTION INTRAVENOUS; SUBCUTANEOUS at 21:37

## 2019-02-16 RX ADMIN — Medication 1 MG: at 13:08

## 2019-02-16 RX ADMIN — FOLIC ACID SCH MLS/HR: 5 INJECTION, SOLUTION INTRAMUSCULAR; INTRAVENOUS; SUBCUTANEOUS at 17:44

## 2019-02-16 RX ADMIN — FOLIC ACID SCH MLS/HR: 5 INJECTION, SOLUTION INTRAMUSCULAR; INTRAVENOUS; SUBCUTANEOUS at 20:55

## 2019-02-16 RX ADMIN — INSULIN ASPART 1 UNIT: 100 INJECTION, SOLUTION INTRAVENOUS; SUBCUTANEOUS at 21:00

## 2019-02-16 RX ADMIN — PIPERACILLIN SODIUM AND TAZOBACTAM SODIUM SCH MLS/HR: 3; .375 INJECTION, POWDER, LYOPHILIZED, FOR SOLUTION INTRAVENOUS at 17:44

## 2019-02-16 RX ADMIN — PIPERACILLIN SODIUM AND TAZOBACTAM SODIUM 1 MLS/HR: 3; .375 INJECTION, POWDER, LYOPHILIZED, FOR SOLUTION INTRAVENOUS at 17:44

## 2019-02-16 RX ADMIN — INSULIN ASPART 1 UNIT: 100 INJECTION, SOLUTION INTRAVENOUS; SUBCUTANEOUS at 01:00

## 2019-02-16 RX ADMIN — PIPERACILLIN SODIUM AND TAZOBACTAM SODIUM 1 MLS/HR: 3; .375 INJECTION, POWDER, LYOPHILIZED, FOR SOLUTION INTRAVENOUS at 23:40

## 2019-02-16 RX ADMIN — THIAMINE HYDROCHLORIDE 1 MLS/HR: 100 INJECTION, SOLUTION INTRAMUSCULAR; INTRAVENOUS at 17:44

## 2019-02-16 RX ADMIN — THIAMINE HYDROCHLORIDE 1 MLS/HR: 100 INJECTION, SOLUTION INTRAMUSCULAR; INTRAVENOUS at 10:55

## 2019-02-16 RX ADMIN — INSULIN ASPART 1 UNIT: 100 INJECTION, SOLUTION INTRAVENOUS; SUBCUTANEOUS at 05:00

## 2019-02-16 RX ADMIN — PIPERACILLIN SODIUM AND TAZOBACTAM SODIUM 1 MLS/HR: 3; .375 INJECTION, POWDER, LYOPHILIZED, FOR SOLUTION INTRAVENOUS at 00:19

## 2019-02-16 RX ADMIN — PIPERACILLIN SODIUM AND TAZOBACTAM SODIUM 1 MLS/HR: 3; .375 INJECTION, POWDER, LYOPHILIZED, FOR SOLUTION INTRAVENOUS at 13:08

## 2019-02-16 RX ADMIN — HEPARIN SODIUM SCH UNIT: 5000 INJECTION, SOLUTION INTRAVENOUS; SUBCUTANEOUS at 09:00

## 2019-02-16 RX ADMIN — PIPERACILLIN SODIUM AND TAZOBACTAM SODIUM SCH MLS/HR: 3; .375 INJECTION, POWDER, LYOPHILIZED, FOR SOLUTION INTRAVENOUS at 00:19

## 2019-02-16 RX ADMIN — BISACODYL PRN MG: 10 SUPPOSITORY RECTAL at 22:16

## 2019-02-16 RX ADMIN — THIAMINE HYDROCHLORIDE 1 MLS/HR: 100 INJECTION, SOLUTION INTRAMUSCULAR; INTRAVENOUS at 20:55

## 2019-02-16 RX ADMIN — THIAMINE HYDROCHLORIDE 1 MLS/HR: 100 INJECTION, SOLUTION INTRAMUSCULAR; INTRAVENOUS at 07:27

## 2019-02-16 RX ADMIN — PIPERACILLIN SODIUM AND TAZOBACTAM SODIUM 1 MLS/HR: 3; .375 INJECTION, POWDER, LYOPHILIZED, FOR SOLUTION INTRAVENOUS at 05:34

## 2019-02-16 RX ADMIN — FOLIC ACID SCH MLS/HR: 5 INJECTION, SOLUTION INTRAMUSCULAR; INTRAVENOUS; SUBCUTANEOUS at 10:55

## 2019-02-16 RX ADMIN — HEPARIN SODIUM SCH UNIT: 5000 INJECTION, SOLUTION INTRAVENOUS; SUBCUTANEOUS at 21:37

## 2019-02-16 RX ADMIN — INSULIN ASPART 1 UNIT: 100 INJECTION, SOLUTION INTRAVENOUS; SUBCUTANEOUS at 09:00

## 2019-02-16 RX ADMIN — Medication 1 MG: at 22:16

## 2019-02-16 RX ADMIN — BISACODYL PRN MG: 10 SUPPOSITORY RECTAL at 13:08

## 2019-02-16 RX ADMIN — INSULIN ASPART 1 UNIT: 100 INJECTION, SOLUTION INTRAVENOUS; SUBCUTANEOUS at 17:00

## 2019-02-16 RX ADMIN — PIPERACILLIN SODIUM AND TAZOBACTAM SODIUM SCH MLS/HR: 3; .375 INJECTION, POWDER, LYOPHILIZED, FOR SOLUTION INTRAVENOUS at 13:08

## 2019-02-16 RX ADMIN — FOLIC ACID SCH MLS/HR: 5 INJECTION, SOLUTION INTRAMUSCULAR; INTRAVENOUS; SUBCUTANEOUS at 00:55

## 2019-02-16 RX ADMIN — HEPARIN SODIUM 1 UNIT: 5000 INJECTION, SOLUTION INTRAVENOUS; SUBCUTANEOUS at 09:00

## 2019-02-16 RX ADMIN — PIPERACILLIN SODIUM AND TAZOBACTAM SODIUM SCH MLS/HR: 3; .375 INJECTION, POWDER, LYOPHILIZED, FOR SOLUTION INTRAVENOUS at 05:34

## 2019-02-16 RX ADMIN — INSULIN ASPART 1 UNIT: 100 INJECTION, SOLUTION INTRAVENOUS; SUBCUTANEOUS at 13:00

## 2019-02-16 NOTE — CONS
DATE OF ADMISSION: 02/14/2019

DATE OF CONSULTATION:  

 

 

 

Dear Dr. Hodge:

 

Thank you for asking me to see Mr. Salazar in GI consultation.  The patient, as you know, is a 63-ye
ar-old [place] gentleman who is admitted to the hospital because of vomiting.  Apparently, he is kno
wn to have a cholangiocarcinoma and he had 2 plastic stents put in this hospital, 1 into the left and
 1 into the right hepatic duct.  Subsequently, he went to [facility].  He had 2 metal stents placed, 1 into
 the right and 1 into the left hepatic duct.  He is being treated with chemotherapy at this time.  He
 has received 2 courses of chemotherapy.

 

CAT scan of the abdomen showed evidence of a cholangiocarcinoma, perhaps invading into the gastroduod
enal junction.  The images are not available.

 

Other medical problems include diabetes.

 

MEDICATIONS:

1.  Zofran.

2.  Tylenol.

3.  Norco.

4.  Morphine.

5.  Heparin.

 

PHYSICAL EXAMINATION:

GENERAL:  The patient is a 63-year-old [place] gentleman who at this time is alert, well built.

VITAL SIGNS:  He is afebrile.

CARDIOVASCULAR:  Normal heart sounds.

RESPIRATORY:  Normal breath sounds.

ABDOMEN:  Shows soft abdomen with no palpable masses.  No tenderness, no distention.

 

LABORATORY WORKUP:  Hemoglobin 9.1, WBC 5600.

 

The potassium is 3.4, bilirubin 0.3, AST is 32, ALT 22, alkaline phosphatase 83.  The CAT scan of the
 abdomen which was ordered by me shows evidence of mild intrahepatic ductal dilatation and with bilat
eral endoprosthesis.  No gallstones noted.

 

They did not comment on the gastric outlet obstruction findings.

 

CLINICAL IMPRESSION:  The patient is presenting with history of repeated vomiting for the past 1 week
.  No vomiting of blood.  No history of passing blood from the rectum.  He has history of cholangioca
rcinoma which is being treated with chemotherapy and he has got bilateral metal stents, 1 in the left
 and 1 in the right hepatic duct.

 

PLAN:  At this time, recommend nasogastric suction because the CAT scan shows large amount of retaine
d food in the stomach and once stomach is decompressed, upper endoscopy will be performed.

 

Once again, doctor, thank you for this consultation.

 

 

Dictated By: ESTHER SEGOVIA/PERLA

DD:    02/16/2019 14:35:59

DT:    02/16/2019 17:25:55

Conf#: [id number]

DID#:  [id number]

CC: DEVON FARAH MD; GOKUL HODGE MD;*[facility]*

## 2019-02-16 NOTE — PN
Date/Time of Note


Date/Time of Note


DATE: 2/16/19 


TIME: 07:41





Assessment/Plan


VTE Prophylaxis


Risk score (from Ns)>0 risk:  2


SCD applied (from Nsg):  Yes


Pharmacological prophylaxis:  heparin





Lines/Catheters


IV Catheter Type (from Nrs):  Peripheral IV


Urinary Cath still in place:  No





Assessment/Plan


Problems:  


(1) Obstructed, gastric outlet


Status:  Acute


Comment:  Patient had CT scan and GI consult but notes are not in the computer 


medical record at this time.  Her course of action today will be predicated upon


that.  Since he had fevers and cultures performed over [facility] 


will call there to try and get the microbiology reports to help guide our 


therapeutics.  Fernanda has seen the patient in consultation, their input is 


noted





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


Onset Date:  ~ 9/2018      Status:  Chronic


Comment:  He had a metal stent for biliary drainage.  We will try and make sure 


that there are not any infectious issues and work with GI to get his GI tract in


use.  The consult from oncology is noted





(3) Diabetes mellitus type 2 in nonobese


Status:  Chronic


Comment:  Adequate glycemic control





(4) H/O four vessel coronary artery bypass graft


Onset Date:  ~ 2/2010      Status:  Chronic


Comment:  No evidence of active coronary disease at this time





Result Diagram:  


2/16/19 0551                                                                    


           2/15/19 0524





Results 24hrs





Laboratory Tests


Test
                 2/15/19
08:24  2/15/19
13:01  2/15/19
17:39  2/15/19
21:11


Bedside Glucose               123            107             93             78


Test
                 2/16/19
01:43  2/16/19
05:36  2/16/19
05:51  



Bedside Glucose                85             86


White Blood Count                                           5.6


Red Blood Count                                           3.05  L


Hemoglobin                                                 9.1  L


Hematocrit                                                27.2  L


Mean Corpuscular                                           89.2


Volume


Mean Corpuscular                                           29.8


Hemoglobin


Mean Corpuscular      
              
                    33.5  
  



Hemoglobin
Concent


Red Cell                                                  15.7  H


Distribution Width


Platelet Count                                              319


Mean Platelet Volume                                        9.3


Immature                                                  0.400


Granulocytes %


Neutrophils %                                              59.3


Lymphocytes %                                              22.6


Monocytes %                                               16.5  H


Eosinophils %                                               0.7


Basophils %                                                 0.5


Nucleated Red Blood                                         0.0


Cells %


Immature                                                  0.020


Granulocytes #


Neutrophils #                                               3.3


Lymphocytes #                                               1.3


Monocytes #                                                 0.9


Eosinophils #                                               0.0


Basophils #                                                 0.0


Nucleated Red Blood                                         0.0


Cells #








Subjective


24 Hr Interval Summary


Free Text/Dictation


Pleasant [place] speaking gentleman with NG tube in place


Constitutional:  no complaints (Denies fevers chills or sweats)


Respiratory:  no complaints


Cardiovascular:  no complaints


Gastrointestinal:  pain (Planes of abdominal pain)


Genitourinary:  no complaints





Exam/Review of Systems


Exam


Vitals





Vital Signs


  Date      Temp  Pulse  Resp  B/P (MAP)   Pulse Ox  O2          O2 Flow    FiO2


Time                                                 Delivery    Rate


   2/16/19  98.1     62    20      127/61       100


     07:18                           (83)








Intake and Output





2/15/19


2/15/19


2/16/19





1515:00


23:00


07:00





IntakeIntake Total


1100 ml





OutputOutput Total


300 ml





BalanceBalance


800 ml


1100 ml


1100 ml











Constitutional:  alert, oriented


Head:  normocephalic, atraumatic


Eyes:  nl conjunctiva, EOMI


ENMT:  other


Respiratory:  clear to auscultation, normal air movement


Cardiovascular:  regular rate and rhythm, nl pulses, other (Midline surgical 


sternotomy scar)


Gastrointestinal:  soft, nl liver, spleen, non-tender





Results


Results 24hrs





Laboratory Tests


Test
                 2/15/19
08:24  2/15/19
13:01  2/15/19
17:39  2/15/19
21:11


Bedside Glucose               123            107             93             78


Test
                 2/16/19
01:43  2/16/19
05:36  2/16/19
05:51  



Bedside Glucose                85             86


White Blood Count                                           5.6


Red Blood Count                                           3.05  L


Hemoglobin                                                 9.1  L


Hematocrit                                                27.2  L


Mean Corpuscular                                           89.2


Volume


Mean Corpuscular                                           29.8


Hemoglobin


Mean Corpuscular      
              
                    33.5  
  



Hemoglobin
Concent


Red Cell                                                  15.7  H


Distribution Width


Platelet Count                                              319


Mean Platelet Volume                                        9.3


Immature                                                  0.400


Granulocytes %


Neutrophils %                                              59.3


Lymphocytes %                                              22.6


Monocytes %                                               16.5  H


Eosinophils %                                               0.7


Basophils %                                                 0.5


Nucleated Red Blood                                         0.0


Cells %


Immature                                                  0.020


Granulocytes #


Neutrophils #                                               3.3


Lymphocytes #                                               1.3


Monocytes #                                                 0.9


Eosinophils #                                               0.0


Basophils #                                                 0.0


Nucleated Red Blood                                         0.0


Cells #








Medications


Medication





Current Medications


Sodium Chloride 1,000 ml @  100 mls/hr Q10H IV  Last administered on 2/16/19at 


07:27; Admin Dose 100 MLS/HR;  Start 2/14/19 at 18:55


IV Flush (NS 3 ml) 3 ml PER PROTOCOL IV ;  Start 2/14/19 at 19:00


Ondansetron HCl (Zofran Inj) 4 mg Q6H  PRN IV NAUSEA/VOMITING;  Start 2/14/19 at


19:00


Acetaminophen (Tylenol Tab) 650 mg Q6H  PRN PO .PAIN 1-3 OR TEMP;  Start 2/14/19


at 19:00


Acetaminophen/ Hydrocodone Bitart (Norco (5/325)) 1 tab Q6H  PRN PO .MOD PAIN 4-


6;  Start 2/14/19 at 19:00


Morphine Sulfate (morphine) 2 mg Q4H  PRN IV .SEVERE PAIN 7-10;  Start 2/14/19 


at 19:00


Heparin Sodium (Porcine) (Heparin  (5000 Units/1ml)) 5,000 unit Q12 SC  Last 


administered on 2/15/19at 22:01; Admin Dose 5,000 UNIT;  Start 2/14/19 at 21:00


Diagnostic Test (Pha) (Accu-Chek) 1 ea 02 XX ;  Start 2/15/19 at 02:00


Insulin Aspart (Novolog Insulin Pen) NOVOLOG *MILD* ALGORI... Q4 SC ;  Start 


2/14/19 at 21:00


Miscellaneous Information 1 ea NOTE XX ;  Start 2/14/19 at 19:30


Glucose (Glutose) 15 gm Q15M  PRN PO DECREASED GLUCOSE;  Start 2/14/19 at 19:30


Glucose (Glutose) 22.5 gm Q15M  PRN PO DECREASED GLUCOSE;  Start 2/14/19 at 


19:30


Dextrose (D50w Syringe) 25 ml Q15M  PRN IV DECREASED GLUCOSE;  Start 2/14/19 at 


19:30


Dextrose (D50w Syringe) 50 ml Q15M  PRN IV DECREASED GLUCOSE;  Start 2/14/19 at 


19:30


Glucagon (Glucagen) 1 mg Q15M  PRN IM DECREASED GLUCOSE;  Start 2/14/19 at 19:30


Glucose (Glutose) 15 gm Q15M  PRN BUCCAL DECREASED GLUCOSE;  Start 2/14/19 at 


19:30


Piperacillin Sod/ Tazobactam Sod 100 ml @  200 mls/hr Q6 IVPB  Last administered


on 2/16/19at 05:34; Admin Dose 200 MLS/HR;  Start 2/15/19 at 00:30











INGE RICHARDSON MD              Feb 16, 2019 07:45

## 2019-02-16 NOTE — CONS
Assessment/Plan


Assessment/Plan


Hospital Course (Demo Recall)


ID PROGRESS NOTE 


CURRENT ABX: DAY #  Zosyn 


24H INTERVAL SUMMARY


* Sleeping, VSS, NAD, no fever today -- chart reviewed = pending EGD 


* 2/15/19 CT ABD-PEL:  IMPRESSION:


   * Mild intrahepatic ductal dilatation in patient with bilateral biliary 


     endoprosthesis.


   * No gallstones visualize. Sheet-like soft tissue surrounding gallbladder and


     within the siria hepatis as seen on recent MRI. Question cholangiocarcinoma


     versus gallbladder cancer versus metastatic adenopathy. Consider biopsy.


   * Vascular calcifications.


   * Left renal cyst.


   * No evidence of urolithiasis, obstructive uropathy, diverticulitis or 


     appendicitis.


   * Small right pleural effusion.





MICRO


* BCX @ [facility] (-) 


* BCx (+) GNR @ [facility]: Microbiology at the other hospital informs us that his


  blood cultures are positive for Klebsiella species not Klebsiella pneumoniae





PHYSICAL EXAMINATION:


GENERAL: Afebrile, VSS


HEENT: AT, NC, anicteric 


NECK:  Grossly normal 


CHEST: Equal chest rise bilaterally = without dyspnea 


HEART:  Pulse RRR


ABDOMEN:  Deferred, sleeping 


EXTREMITIES:  Warm,


SKIN: No rash, no diaphoresis





ID ASSESSMENT


64 yo M admit with:


(1) SIRS w/Tmax 99.6 on TNS from [facility]


* 02/15/19 BCX @ [facility] (-) 


* BCx (+) GNR @ [facility]: Microbiology at the other hospital informs us that his


  blood cultures are positive for Klebsiella species not Klebsiella pneumoniae


(2) Obstructed, gastric outlet


* Status:  Acute 


*  Repeat obstruction and stent replaced at [facility] last week


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 


* Onset Date:  ~ 9/2018      Status:  Chronic


* s/p 12/24/18  ERCP, sphincterotomy, brushings of the malignant stricture of 


  the bile duct, placement of 2 stents, one into the left hepatic duct and one 


  into the right hepatic duct.


(4) Diabetes mellitus type 2 in nonobese


* Status:  Chronic/Stable 


(5) H/O four vessel coronary artery bypass graft


* Onset Date:  ~ 2/2010      Status:  Chronic/asymptomatic 


ABX ALLERGIES: KNDA 


INVASIVES: PIV


CURRENT ABX: DAY # => Zosyn 





ID RECOMMENDATIONS/PLAN:  


1. Continue Zosyn 


2. Pending EGD


3. Will f/u tomorrow 


.





Consultation Date/Type/Reason


Admit Date/Time


Feb 14, 2019 at 18:07


Initial Consult Date





Date/Time of Note


DATE: 2/16/19 


TIME: 18:07





Exam/Review of Systems


Exam


Vitals





Vital Signs


  Date      Temp  Pulse  Resp  B/P (MAP)   Pulse Ox  O2          O2 Flow    FiO2


Time                                                 Delivery    Rate


   2/16/19           79            154/70


     14:37                           (98)


   2/16/19  98.5           18                   100


     14:04








Intake and Output





2/15/19


2/15/19


2/16/19





1515:00


23:00


07:00





IntakeIntake Total


1100 ml





OutputOutput Total


300 ml





BalanceBalance


800 ml


1100 ml


1100 ml














Results


Result Diagram:  


2/16/19 0551                                                                    


           2/16/19 0551





Results 24hrs





Laboratory Tests


Test
                 2/15/19
21:11  2/16/19
01:43  2/16/19
05:36  2/16/19
05:51


Bedside Glucose                78             85             86


White Blood Count                                                          5.6


Red Blood Count                                                          3.05  L


Hemoglobin                                                                9.1  L


Hematocrit                                                               27.2  L


Mean Corpuscular                                                          89.2


Volume


Mean Corpuscular                                                          29.8


Hemoglobin


Mean Corpuscular      
              
              
                    33.5  



Hemoglobin
Concent


Red Cell                                                                 15.7  H


Distribution Width


Platelet Count                                                             319


Mean Platelet Volume                                                       9.3


Immature                                                                 0.400


Granulocytes %


Neutrophils %                                                             59.3


Lymphocytes %                                                             22.6


Monocytes %                                                              16.5  H


Eosinophils %                                                              0.7


Basophils %                                                                0.5


Nucleated Red Blood                                                        0.0


Cells %


Immature                                                                 0.020


Granulocytes #


Neutrophils #                                                              3.3


Lymphocytes #                                                              1.3


Monocytes #                                                                0.9


Eosinophils #                                                              0.0


Basophils #                                                                0.0


Nucleated Red Blood                                                        0.0


Cells #


Sodium Level                                                               137


Potassium Level                                                           3.4  L


Chloride Level                                                             106


Carbon Dioxide Level                                                        22


Anion Gap                                                                    9


Blood Urea Nitrogen                                                          9


Creatinine                                                               0.58  L


Est Glomerular        
              
              
              > 60  



Filtrat Rate
mL/min


Glucose Level                                                               81


Calcium Level                                                             8.3  L


Total Bilirubin                                                            0.3


Direct Bilirubin                                                          0.00


Indirect Bilirubin                                                         0.3


Aspartate Amino       
              
              
                      32  



Transf
(AST/SGOT)


Alanine               
              
              
                      22  



Aminotransferase
(AL


T/SGPT)


Alkaline Phosphatase                                                        83


Total Protein                                                             5.6  L


Albumin                                                                   2.8  L


Globulin                                                                  2.80


Albumin/Globulin                                                          1.00


Ratio


Test
                 2/16/19
09:07  2/16/19
13:12  2/16/19
17:43  



Bedside Glucose                86             92             88








Medications


Medication





Current Medications


Sodium Chloride 1,000 ml @  100 mls/hr Q10H IV  Last administered on 2/16/19at 


17:44; Admin Dose 100 MLS/HR;  Start 2/14/19 at 18:55


IV Flush (NS 3 ml) 3 ml PER PROTOCOL IV ;  Start 2/14/19 at 19:00


Ondansetron HCl (Zofran Inj) 4 mg Q6H  PRN IV NAUSEA/VOMITING;  Start 2/14/19 at


19:00


Acetaminophen (Tylenol Tab) 650 mg Q6H  PRN PO .PAIN 1-3 OR TEMP;  Start 2/14/19


at 19:00


Acetaminophen/ Hydrocodone Bitart (Norco (5/325)) 1 tab Q6H  PRN PO .MOD PAIN 4-


6;  Start 2/14/19 at 19:00


Morphine Sulfate (morphine) 2 mg Q4H  PRN IV .SEVERE PAIN 7-10;  Start 2/14/19 


at 19:00


Heparin Sodium (Porcine) (Heparin  (5000 Units/1ml)) 5,000 unit Q12 SC  Last 


administered on 2/15/19at 22:01; Admin Dose 5,000 UNIT;  Start 2/14/19 at 21:00


Diagnostic Test (Pha) (Accu-Chek) 1 ea 02 XX ;  Start 2/15/19 at 02:00


Insulin Aspart (Novolog Insulin Pen) NOVOLOG *MILD* ALGORI... Q4 SC ;  Start 


2/14/19 at 21:00


Miscellaneous Information 1 ea NOTE XX ;  Start 2/14/19 at 19:30


Glucose (Glutose) 15 gm Q15M  PRN PO DECREASED GLUCOSE;  Start 2/14/19 at 19:30


Glucose (Glutose) 22.5 gm Q15M  PRN PO DECREASED GLUCOSE;  Start 2/14/19 at 


19:30


Dextrose (D50w Syringe) 25 ml Q15M  PRN IV DECREASED GLUCOSE;  Start 2/14/19 at 


19:30


Dextrose (D50w Syringe) 50 ml Q15M  PRN IV DECREASED GLUCOSE;  Start 2/14/19 at 


19:30


Glucagon (Glucagen) 1 mg Q15M  PRN IM DECREASED GLUCOSE;  Start 2/14/19 at 19:30


Glucose (Glutose) 15 gm Q15M  PRN BUCCAL DECREASED GLUCOSE;  Start 2/14/19 at 


19:30


Piperacillin Sod/ Tazobactam Sod 100 ml @  200 mls/hr Q6 IVPB  Last administered


on 2/16/19at 17:44; Admin Dose 200 MLS/HR;  Start 2/15/19 at 00:30


Bisacodyl (Dulcolax Supp) 10 mg DAILY  PRN ND CONSTIPATION Last administered on 


2/16/19at 13:08; Admin Dose 10 MG;  Start 2/16/19 at 12:00











ANTONIO LYNCH NP              Feb 16, 2019 18:07

## 2019-02-17 VITALS — RESPIRATION RATE: 18 BRPM | DIASTOLIC BLOOD PRESSURE: 71 MMHG | HEART RATE: 63 BPM | SYSTOLIC BLOOD PRESSURE: 152 MMHG

## 2019-02-17 VITALS — RESPIRATION RATE: 15 BRPM | HEART RATE: 66 BPM | DIASTOLIC BLOOD PRESSURE: 59 MMHG | SYSTOLIC BLOOD PRESSURE: 118 MMHG

## 2019-02-17 VITALS — DIASTOLIC BLOOD PRESSURE: 70 MMHG | SYSTOLIC BLOOD PRESSURE: 117 MMHG | HEART RATE: 70 BPM | RESPIRATION RATE: 27 BRPM

## 2019-02-17 VITALS — SYSTOLIC BLOOD PRESSURE: 52 MMHG | HEART RATE: 72 BPM | RESPIRATION RATE: 21 BRPM | DIASTOLIC BLOOD PRESSURE: 55 MMHG

## 2019-02-17 VITALS — DIASTOLIC BLOOD PRESSURE: 53 MMHG | RESPIRATION RATE: 22 BRPM | HEART RATE: 68 BPM | SYSTOLIC BLOOD PRESSURE: 110 MMHG

## 2019-02-17 VITALS — DIASTOLIC BLOOD PRESSURE: 54 MMHG | SYSTOLIC BLOOD PRESSURE: 116 MMHG | HEART RATE: 68 BPM | RESPIRATION RATE: 30 BRPM

## 2019-02-17 VITALS — DIASTOLIC BLOOD PRESSURE: 65 MMHG | SYSTOLIC BLOOD PRESSURE: 149 MMHG | RESPIRATION RATE: 18 BRPM | HEART RATE: 63 BPM

## 2019-02-17 VITALS — HEART RATE: 68 BPM | DIASTOLIC BLOOD PRESSURE: 58 MMHG | RESPIRATION RATE: 18 BRPM | SYSTOLIC BLOOD PRESSURE: 118 MMHG

## 2019-02-17 VITALS — DIASTOLIC BLOOD PRESSURE: 71 MMHG | SYSTOLIC BLOOD PRESSURE: 137 MMHG | RESPIRATION RATE: 18 BRPM | HEART RATE: 72 BPM

## 2019-02-17 VITALS — DIASTOLIC BLOOD PRESSURE: 65 MMHG | SYSTOLIC BLOOD PRESSURE: 135 MMHG | RESPIRATION RATE: 16 BRPM | HEART RATE: 61 BPM

## 2019-02-17 VITALS — RESPIRATION RATE: 1 BRPM | HEART RATE: 68 BPM | SYSTOLIC BLOOD PRESSURE: 130 MMHG | DIASTOLIC BLOOD PRESSURE: 70 MMHG

## 2019-02-17 VITALS — SYSTOLIC BLOOD PRESSURE: 124 MMHG | HEART RATE: 73 BPM | DIASTOLIC BLOOD PRESSURE: 58 MMHG | RESPIRATION RATE: 18 BRPM

## 2019-02-17 VITALS — DIASTOLIC BLOOD PRESSURE: 70 MMHG | SYSTOLIC BLOOD PRESSURE: 136 MMHG | RESPIRATION RATE: 25 BRPM | HEART RATE: 64 BPM

## 2019-02-17 VITALS — HEART RATE: 68 BPM | DIASTOLIC BLOOD PRESSURE: 52 MMHG | RESPIRATION RATE: 22 BRPM | SYSTOLIC BLOOD PRESSURE: 112 MMHG

## 2019-02-17 VITALS — DIASTOLIC BLOOD PRESSURE: 70 MMHG | SYSTOLIC BLOOD PRESSURE: 151 MMHG | RESPIRATION RATE: 18 BRPM | HEART RATE: 62 BPM

## 2019-02-17 LAB
ADD MAN DIFF?: NO
ALANINE AMINOTRANSFERASE: 19 IU/L (ref 13–69)
ALBUMIN/GLOBULIN RATIO: 1.03
ALBUMIN: 2.9 G/DL (ref 3.3–4.9)
ALKALINE PHOSPHATASE: 85 IU/L (ref 42–121)
ANION GAP: 9 (ref 5–13)
ASPARTATE AMINO TRANSFERASE: 26 IU/L (ref 15–46)
BASOPHIL #: 0 10^3/UL (ref 0–0.1)
BASOPHILS %: 0.4 % (ref 0–2)
BILIRUBIN,DIRECT: 0 MG/DL (ref 0–0.2)
BILIRUBIN,TOTAL: 0.3 MG/DL (ref 0.2–1.3)
BLOOD UREA NITROGEN: 7 MG/DL (ref 7–20)
CALCIUM: 8.7 MG/DL (ref 8.4–10.2)
CARBON DIOXIDE: 25 MMOL/L (ref 21–31)
CHLORIDE: 105 MMOL/L (ref 97–110)
CREATININE: 0.58 MG/DL (ref 0.61–1.24)
EOSINOPHILS #: 0 10^3/UL (ref 0–0.5)
EOSINOPHILS %: 0.4 % (ref 0–7)
GLOBULIN: 2.8 G/DL (ref 1.3–3.2)
GLUCOSE: 84 MG/DL (ref 70–220)
HEMATOCRIT: 28.7 % (ref 42–52)
HEMOGLOBIN: 9.5 G/DL (ref 14–18)
IMMATURE GRANS #M: 0.03 10^3/UL (ref 0–0.03)
IMMATURE GRANS % (M): 0.6 % (ref 0–0.43)
INR: 1.01
LYMPHOCYTES #: 1.4 10^3/UL (ref 0.8–2.9)
LYMPHOCYTES %: 25.1 % (ref 15–51)
MEAN CORPUSCULAR HEMOGLOBIN: 29.7 PG (ref 29–33)
MEAN CORPUSCULAR HGB CONC: 33.1 G/DL (ref 32–37)
MEAN CORPUSCULAR VOLUME: 89.7 FL (ref 82–101)
MEAN PLATELET VOLUME: 9.7 FL (ref 7.4–10.4)
MONOCYTE #: 0.8 10^3/UL (ref 0.3–0.9)
MONOCYTES %: 14.7 % (ref 0–11)
NEUTROPHIL #: 3.2 10^3/UL (ref 1.6–7.5)
NEUTROPHILS %: 58.8 % (ref 39–77)
NUCLEATED RED BLOOD CELLS #: 0 10^3/UL (ref 0–0)
NUCLEATED RED BLOOD CELLS%: 0 /100WBC (ref 0–0)
PARTIAL THROMBOPLASTIN TIME: 33.1 SEC (ref 23–35)
PLATELET COUNT: 371 10^3/UL (ref 140–415)
POTASSIUM: 3.4 MMOL/L (ref 3.5–5.1)
PROTIME: 13.4 SEC (ref 11.9–14.9)
PT RATIO: 1
RED BLOOD COUNT: 3.2 10^6/UL (ref 4.7–6.1)
RED CELL DISTRIBUTION WIDTH: 15.5 % (ref 11.5–14.5)
SODIUM: 139 MMOL/L (ref 135–144)
TOTAL PROTEIN: 5.7 G/DL (ref 6.1–8.1)
WHITE BLOOD COUNT: 5.5 10^3/UL (ref 4.8–10.8)

## 2019-02-17 PROCEDURE — 0DJ08ZZ INSPECTION OF UPPER INTESTINAL TRACT, VIA NATURAL OR ARTIFICIAL OPENING ENDOSCOPIC: ICD-10-PCS | Performed by: INTERNAL MEDICINE

## 2019-02-17 PROCEDURE — 0DJ08ZZ INSPECTION OF UPPER INTESTINAL TRACT, VIA NATURAL OR ARTIFICIAL OPENING ENDOSCOPIC: ICD-10-PCS

## 2019-02-17 RX ADMIN — PIPERACILLIN SODIUM AND TAZOBACTAM SODIUM 1 MLS/HR: 3; .375 INJECTION, POWDER, LYOPHILIZED, FOR SOLUTION INTRAVENOUS at 05:30

## 2019-02-17 RX ADMIN — PIPERACILLIN SODIUM AND TAZOBACTAM SODIUM SCH MLS/HR: 3; .375 INJECTION, POWDER, LYOPHILIZED, FOR SOLUTION INTRAVENOUS at 14:17

## 2019-02-17 RX ADMIN — HEPARIN SODIUM 1 UNIT: 5000 INJECTION, SOLUTION INTRAVENOUS; SUBCUTANEOUS at 20:30

## 2019-02-17 RX ADMIN — FOLIC ACID SCH MLS/HR: 5 INJECTION, SOLUTION INTRAMUSCULAR; INTRAVENOUS; SUBCUTANEOUS at 19:28

## 2019-02-17 RX ADMIN — ACETAMINOPHEN 1 MG: 325 TABLET, FILM COATED ORAL at 14:40

## 2019-02-17 RX ADMIN — INSULIN ASPART 1 UNIT: 100 INJECTION, SOLUTION INTRAVENOUS; SUBCUTANEOUS at 17:00

## 2019-02-17 RX ADMIN — METOCLOPRAMIDE HYDROCHLORIDE SCH MG: 10 INJECTION, SOLUTION INTRAMUSCULAR; INTRAVENOUS at 18:00

## 2019-02-17 RX ADMIN — THIAMINE HYDROCHLORIDE 1 MLS/HR: 100 INJECTION, SOLUTION INTRAMUSCULAR; INTRAVENOUS at 04:10

## 2019-02-17 RX ADMIN — FOLIC ACID SCH MLS/HR: 5 INJECTION, SOLUTION INTRAMUSCULAR; INTRAVENOUS; SUBCUTANEOUS at 04:10

## 2019-02-17 RX ADMIN — THIAMINE HYDROCHLORIDE 1 MLS/HR: 100 INJECTION, SOLUTION INTRAMUSCULAR; INTRAVENOUS at 19:28

## 2019-02-17 RX ADMIN — POTASSIUM CHLORIDE 1 MLS/HR: 200 INJECTION, SOLUTION INTRAVENOUS at 11:12

## 2019-02-17 RX ADMIN — THIAMINE HYDROCHLORIDE 1 MLS/HR: 100 INJECTION, SOLUTION INTRAMUSCULAR; INTRAVENOUS at 16:55

## 2019-02-17 RX ADMIN — HEPARIN SODIUM SCH UNIT: 5000 INJECTION, SOLUTION INTRAVENOUS; SUBCUTANEOUS at 09:00

## 2019-02-17 RX ADMIN — FOLIC ACID SCH MLS/HR: 5 INJECTION, SOLUTION INTRAMUSCULAR; INTRAVENOUS; SUBCUTANEOUS at 16:55

## 2019-02-17 RX ADMIN — INSULIN ASPART 1 UNIT: 100 INJECTION, SOLUTION INTRAVENOUS; SUBCUTANEOUS at 09:00

## 2019-02-17 RX ADMIN — HEPARIN SODIUM SCH UNIT: 5000 INJECTION, SOLUTION INTRAVENOUS; SUBCUTANEOUS at 20:30

## 2019-02-17 RX ADMIN — METOCLOPRAMIDE HYDROCHLORIDE 1 MG: 10 INJECTION, SOLUTION INTRAMUSCULAR; INTRAVENOUS at 18:00

## 2019-02-17 RX ADMIN — POTASSIUM CHLORIDE 1 MLS/HR: 200 INJECTION, SOLUTION INTRAVENOUS at 15:11

## 2019-02-17 RX ADMIN — METOCLOPRAMIDE HYDROCHLORIDE 1 MG: 10 INJECTION, SOLUTION INTRAMUSCULAR; INTRAVENOUS at 14:41

## 2019-02-17 RX ADMIN — PIPERACILLIN SODIUM AND TAZOBACTAM SODIUM 1 MLS/HR: 3; .375 INJECTION, POWDER, LYOPHILIZED, FOR SOLUTION INTRAVENOUS at 19:33

## 2019-02-17 RX ADMIN — POTASSIUM CHLORIDE SCH MLS/HR: 200 INJECTION, SOLUTION INTRAVENOUS at 15:11

## 2019-02-17 RX ADMIN — POTASSIUM CHLORIDE SCH MLS/HR: 200 INJECTION, SOLUTION INTRAVENOUS at 11:12

## 2019-02-17 RX ADMIN — METOCLOPRAMIDE HYDROCHLORIDE SCH MG: 10 INJECTION, SOLUTION INTRAMUSCULAR; INTRAVENOUS at 14:41

## 2019-02-17 RX ADMIN — INSULIN ASPART 1 UNIT: 100 INJECTION, SOLUTION INTRAVENOUS; SUBCUTANEOUS at 05:00

## 2019-02-17 RX ADMIN — PIPERACILLIN SODIUM AND TAZOBACTAM SODIUM SCH MLS/HR: 3; .375 INJECTION, POWDER, LYOPHILIZED, FOR SOLUTION INTRAVENOUS at 19:33

## 2019-02-17 RX ADMIN — PIPERACILLIN SODIUM AND TAZOBACTAM SODIUM 1 MLS/HR: 3; .375 INJECTION, POWDER, LYOPHILIZED, FOR SOLUTION INTRAVENOUS at 14:17

## 2019-02-17 RX ADMIN — PIPERACILLIN SODIUM AND TAZOBACTAM SODIUM SCH MLS/HR: 3; .375 INJECTION, POWDER, LYOPHILIZED, FOR SOLUTION INTRAVENOUS at 05:30

## 2019-02-17 RX ADMIN — INSULIN ASPART 1 UNIT: 100 INJECTION, SOLUTION INTRAVENOUS; SUBCUTANEOUS at 14:24

## 2019-02-17 RX ADMIN — HEPARIN SODIUM 1 UNIT: 5000 INJECTION, SOLUTION INTRAVENOUS; SUBCUTANEOUS at 09:00

## 2019-02-17 RX ADMIN — INSULIN ASPART 1 UNIT: 100 INJECTION, SOLUTION INTRAVENOUS; SUBCUTANEOUS at 01:00

## 2019-02-17 RX ADMIN — INSULIN ASPART 1 UNIT: 100 INJECTION, SOLUTION INTRAVENOUS; SUBCUTANEOUS at 21:00

## 2019-02-17 NOTE — PN
Date/Time of Note


Date/Time of Note


DATE: 2/17/19 


TIME: 10:00





Assessment/Plan


VTE Prophylaxis


Risk score (from Ns)>0 risk:  5


SCD applied (from Ns):  Yes


Pharmacological prophylaxis:  heparin





Lines/Catheters


IV Catheter Type (from Nrs):  Peripheral IV


Urinary Cath still in place:  No





Assessment/Plan


Problems:  


(1) Obstructed, gastric outlet


Status:  Acute


Comment:  EGD today.  Hopefully will be able to get something past the 


obstruction so that we can work with his GI tract





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


Onset Date:  ~ 9/2018      Status:  Chronic


Comment:  Received 2 rounds of chemotherapy.  This is still a non-favorable 


diagnosis





(3) Diabetes mellitus type 2 in nonobese


Status:  Chronic


Comment:  Adequate glycemic control





(4) H/O four vessel coronary artery bypass graft


Onset Date:  ~ 2/2010      Status:  Chronic


Comment:  Fortunately fully operational no chest pain





Result Diagram:  


2/17/19 0551                                                                    


           2/17/19 0551





Results 24hrs





Laboratory Tests


Test
                 2/16/19
13:12  2/16/19
17:43  2/16/19
21:34  2/17/19
01:37


Bedside Glucose                92             88             78             87


Test
                 2/17/19
05:30  2/17/19
05:51  2/17/19
09:33  



Bedside Glucose                84                            93


White Blood Count                            5.5


Red Blood Count                            3.20  L


Hemoglobin                                  9.5  L


Hematocrit                                 28.7  L


Mean Corpuscular                            89.7


Volume


Mean Corpuscular                            29.7


Hemoglobin


Mean Corpuscular      
                    33.1  
  
              



Hemoglobin
Concent


Red Cell                                   15.5  H


Distribution Width


Platelet Count                               371


Mean Platelet Volume                         9.7


Immature                                  0.600  H


Granulocytes %


Neutrophils %                               58.8


Lymphocytes %                               25.1


Monocytes %                                14.7  H


Eosinophils %                                0.4


Basophils %                                  0.4


Nucleated Red Blood                          0.0


Cells %


Immature                                   0.030


Granulocytes #


Neutrophils #                                3.2


Lymphocytes #                                1.4


Monocytes #                                  0.8


Eosinophils #                                0.0


Basophils #                                  0.0


Nucleated Red Blood                          0.0


Cells #


Prothrombin Time                            13.4


Prothrombin Time                             1.0


Ratio


INR International     
                    1.01  
  
              



Normalized
Ratio


Activated             
                    33.1  
  
              



Partial
Thromboplast


Time


Sodium Level                                 139


Potassium Level                             3.4  L


Chloride Level                               105


Carbon Dioxide Level                          25


Anion Gap                                      9


Blood Urea Nitrogen                            7


Creatinine                                 0.58  L


Est Glomerular        
              > 60  
        
              



Filtrat Rate
mL/min


Glucose Level                                 84


Calcium Level                                8.7


Total Bilirubin                              0.3


Direct Bilirubin                            0.00


Indirect Bilirubin                           0.3


Aspartate Amino       
                      26  
  
              



Transf
(AST/SGOT)


Alanine               
                      19  
  
              



Aminotransferase
(AL


T/SGPT)


Alkaline Phosphatase                          85


Total Protein                               5.7  L


Albumin                                     2.9  L


Globulin                                    2.80


Albumin/Globulin                            1.03


Ratio








Subjective


24 Hr Interval Summary


Free Text/Dictation


She reports he is having some abdominal pain but no nausea or vomiting.  He is 


looking forward to having his EGD today


Constitutional:  no complaints


Respiratory:  no complaints


Cardiovascular:  no complaints


Gastrointestinal:  pain, nausea


Genitourinary:  no complaints


Musculoskeletal:  no complaints





Exam/Review of Systems


Exam


Vitals





Vital Signs


  Date      Temp  Pulse  Resp  B/P (MAP)   Pulse Ox  O2          O2 Flow    FiO2


Time                                                 Delivery    Rate


   2/17/19  98.5     61    16      135/65        99


     07:46                           (88)








Intake and Output





2/16/19 2/16/19 2/17/19





1515:00


23:00


07:00





IntakeIntake Total


200 ml


1100 ml


1290 ml





OutputOutput Total


900 ml


80 ml


900 ml





BalanceBalance


-700 ml


1020 ml


390 ml











Constitutional:  alert, oriented


Respiratory:  clear to auscultation, normal air movement


Cardiovascular:  regular rate and rhythm, nl pulses


Gastrointestinal:  soft, nl liver, spleen, non-tender, bowel sounds (Active 


bowel sounds)





Results


Results 24hrs





Laboratory Tests


Test
                 2/16/19
13:12  2/16/19
17:43  2/16/19
21:34  2/17/19
01:37


Bedside Glucose                92             88             78             87


Test
                 2/17/19
05:30  2/17/19
05:51  2/17/19
09:33  



Bedside Glucose                84                            93


White Blood Count                            5.5


Red Blood Count                            3.20  L


Hemoglobin                                  9.5  L


Hematocrit                                 28.7  L


Mean Corpuscular                            89.7


Volume


Mean Corpuscular                            29.7


Hemoglobin


Mean Corpuscular      
                    33.1  
  
              



Hemoglobin
Concent


Red Cell                                   15.5  H


Distribution Width


Platelet Count                               371


Mean Platelet Volume                         9.7


Immature                                  0.600  H


Granulocytes %


Neutrophils %                               58.8


Lymphocytes %                               25.1


Monocytes %                                14.7  H


Eosinophils %                                0.4


Basophils %                                  0.4


Nucleated Red Blood                          0.0


Cells %


Immature                                   0.030


Granulocytes #


Neutrophils #                                3.2


Lymphocytes #                                1.4


Monocytes #                                  0.8


Eosinophils #                                0.0


Basophils #                                  0.0


Nucleated Red Blood                          0.0


Cells #


Prothrombin Time                            13.4


Prothrombin Time                             1.0


Ratio


INR International     
                    1.01  
  
              



Normalized
Ratio


Activated             
                    33.1  
  
              



Partial
Thromboplast


Time


Sodium Level                                 139


Potassium Level                             3.4  L


Chloride Level                               105


Carbon Dioxide Level                          25


Anion Gap                                      9


Blood Urea Nitrogen                            7


Creatinine                                 0.58  L


Est Glomerular        
              > 60  
        
              



Filtrat Rate
mL/min


Glucose Level                                 84


Calcium Level                                8.7


Total Bilirubin                              0.3


Direct Bilirubin                            0.00


Indirect Bilirubin                           0.3


Aspartate Amino       
                      26  
  
              



Transf
(AST/SGOT)


Alanine               
                      19  
  
              



Aminotransferase
(AL


T/SGPT)


Alkaline Phosphatase                          85


Total Protein                               5.7  L


Albumin                                     2.9  L


Globulin                                    2.80


Albumin/Globulin                            1.03


Ratio








Medications


Medication





Current Medications


Sodium Chloride 1,000 ml @  100 mls/hr Q10H IV  Last administered on 2/17/19at 


04:10; Admin Dose 100 MLS/HR;  Start 2/14/19 at 18:55


IV Flush (NS 3 ml) 3 ml PER PROTOCOL IV ;  Start 2/14/19 at 19:00


Ondansetron HCl (Zofran Inj) 4 mg Q6H  PRN IV NAUSEA/VOMITING;  Start 2/14/19 at


19:00


Acetaminophen (Tylenol Tab) 650 mg Q6H  PRN PO .PAIN 1-3 OR TEMP;  Start 2/14/19


at 19:00


Acetaminophen/ Hydrocodone Bitart (Norco (5/325)) 1 tab Q6H  PRN PO .MOD PAIN 4-


6;  Start 2/14/19 at 19:00


Morphine Sulfate (morphine) 2 mg Q4H  PRN IV .SEVERE PAIN 7-10;  Start 2/14/19 


at 19:00


Heparin Sodium (Porcine) (Heparin  (5000 Units/1ml)) 5,000 unit Q12 SC  Last 


administered on 2/16/19at 21:37; Admin Dose 5,000 UNIT;  Start 2/14/19 at 21:00


Diagnostic Test (Pha) (Accu-Chek) 1 ea 02 XX ;  Start 2/15/19 at 02:00


Insulin Aspart (Novolog Insulin Pen) NOVOLOG *MILD* ALGORI... Q4 SC ;  Start 


2/14/19 at 21:00


Miscellaneous Information 1 ea NOTE XX ;  Start 2/14/19 at 19:30


Glucose (Glutose) 15 gm Q15M  PRN PO DECREASED GLUCOSE;  Start 2/14/19 at 19:30


Glucose (Glutose) 22.5 gm Q15M  PRN PO DECREASED GLUCOSE;  Start 2/14/19 at 


19:30


Dextrose (D50w Syringe) 25 ml Q15M  PRN IV DECREASED GLUCOSE;  Start 2/14/19 at 


19:30


Dextrose (D50w Syringe) 50 ml Q15M  PRN IV DECREASED GLUCOSE;  Start 2/14/19 at 


19:30


Glucagon (Glucagen) 1 mg Q15M  PRN IM DECREASED GLUCOSE;  Start 2/14/19 at 19:30


Glucose (Glutose) 15 gm Q15M  PRN BUCCAL DECREASED GLUCOSE;  Start 2/14/19 at 


19:30


Piperacillin Sod/ Tazobactam Sod 100 ml @  200 mls/hr Q6 IVPB  Last administered


on 2/17/19at 05:30; Admin Dose 200 MLS/HR;  Start 2/15/19 at 00:30


Bisacodyl (Dulcolax Supp) 10 mg DAILY  PRN [place] CONSTIPATION Last administered on 


2/16/19at 22:16; Admin Dose 10 MG;  Start 2/16/19 at 12:00


Potassium Chloride 100 ml @  50 mls/hr Q2H IVPB ;  Start 2/17/19 at 10:00;  Stop


2/17/19 at 13:59;  Status INGE JACKSON MD              Feb 17, 2019 10:02

## 2019-02-17 NOTE — PREAC
Date/Time of Note


Date/Time of Note


DATE: 2/17/19 


TIME: 12:23





Anesthesia Eval and Record


Evaluation


Time Pre-Procedure Interview


DATE: 2/17/19 


TIME: 12:23


Age


63


Sex


male


NPO:  8 hrs


Preoperative diagnosis


gastric outlet obstruction


Planned procedure


EGD





Past Medical History


Past Medical History:  Includes


Cardio:  HTN, CAD, CABG


Endo:  Diabetes


GI:  Other (cholangio carcinoma)





Surgery & Anesthesia Issues


No known issue





Meds


Anticoagulation:  No


Beta Blocker within 24 hr:  No


Reason Beta Blocker not given:  Pt. not on B-Blocker


Active Scripts


Insulin Glargine,Hum.rec.anlog (Basaglar Kwikpen U-100) 100 Unit/1 Ml 


Insuln.pen, 30 UNIT SC DAILY for 30 Days, #2 EA 5 Refills


   Prov:REJI EVANS MD         12/28/18


Insulin Aspart* (Novolog Insulin Pen*) 100 Unit/Ml Soln, 10 UNIT SC WITH MEALS 


for 30 Days, #5 EA 3 Refills


   Prov:REJI EVANS MD         12/28/18


Ursodiol* (Actigall*) 300 Mg Cap, 300 MG PO TID for 30 Days, #90 CAP


   Prov:REJI EVANS MD         12/28/18





Current Medications


Sodium Chloride 1,000 ml @  100 mls/hr Q10H IV  Last administered on 2/17/19at 


04:10; Admin Dose 100 MLS/HR;  Start 2/14/19 at 18:55


IV Flush (NS 3 ml) 3 ml PER PROTOCOL IV ;  Start 2/14/19 at 19:00


Ondansetron HCl (Zofran Inj) 4 mg Q6H  PRN IV NAUSEA/VOMITING;  Start 2/14/19 at


19:00


Acetaminophen (Tylenol Tab) 650 mg Q6H  PRN PO .PAIN 1-3 OR TEMP;  Start 2/14/19


at 19:00


Acetaminophen/ Hydrocodone Bitart (Norco (5/325)) 1 tab Q6H  PRN PO .MOD PAIN 4-


6;  Start 2/14/19 at 19:00


Morphine Sulfate (morphine) 2 mg Q4H  PRN IV .SEVERE PAIN 7-10;  Start 2/14/19 


at 19:00


Heparin Sodium (Porcine) (Heparin  (5000 Units/1ml)) 5,000 unit Q12 SC  Last 


administered on 2/16/19at 21:37; Admin Dose 5,000 UNIT;  Start 2/14/19 at 21:00


Diagnostic Test (Pha) (Accu-Chek) 1 ea 02 XX ;  Start 2/15/19 at 02:00


Insulin Aspart (Novolog Insulin Pen) NOVOLOG *MILD* ALGORI... Q4 SC ;  Start 


2/14/19 at 21:00


Miscellaneous Information 1 ea NOTE XX ;  Start 2/14/19 at 19:30


Glucose (Glutose) 15 gm Q15M  PRN PO DECREASED GLUCOSE;  Start 2/14/19 at 19:30


Glucose (Glutose) 22.5 gm Q15M  PRN PO DECREASED GLUCOSE;  Start 2/14/19 at 


19:30


Dextrose (D50w Syringe) 25 ml Q15M  PRN IV DECREASED GLUCOSE;  Start 2/14/19 at 


19:30


Dextrose (D50w Syringe) 50 ml Q15M  PRN IV DECREASED GLUCOSE;  Start 2/14/19 at 


19:30


Glucagon (Glucagen) 1 mg Q15M  PRN IM DECREASED GLUCOSE;  Start 2/14/19 at 19:30


Glucose (Glutose) 15 gm Q15M  PRN BUCCAL DECREASED GLUCOSE;  Start 2/14/19 at 


19:30


Piperacillin Sod/ Tazobactam Sod 100 ml @  200 mls/hr Q6 IVPB  Last administered


on 2/17/19at 05:30; Admin Dose 200 MLS/HR;  Start 2/15/19 at 00:30


Bisacodyl (Dulcolax Supp) 10 mg DAILY  PRN [place] CONSTIPATION Last administered on 


2/16/19at 22:16; Admin Dose 10 MG;  Start 2/16/19 at 12:00


Potassium Chloride 100 ml @  50 mls/hr Q2H IVPB  Last administered on 2/17/19at 


11:12; Admin Dose 50 MLS/HR;  Start 2/17/19 at 10:00;  Stop 2/17/19 at 13:59


Meds reviewed:  Yes





Allergies


Coded Allergies:  


     No Known Allergy (Verified , 5/23/16)


Allergies Reviewed:  Yes





Labs/Studies


Labs Reviewed:  Reviewed by anesthesiologist


Result Diagram:  


2/17/19 0551                                                                    


           2/17/19 0551





Laboratory Tests


2/17/19 05:51








Pregnancy test:  N/A


Studies:  ECG (                                                                 


                                                                   ), CXR (n/a)





Pre-procedure Exam


Last vitals





Vital Signs


  Date      Temp  Pulse  Resp  B/P (MAP)   Pulse Ox  O2          O2 Flow    FiO2


Time                                                 Delivery    Rate


   2/17/19  98.5     61    16      135/65        99


     07:46                           (88)





Airway:  Adequate mouth opening


Mallampati:  Mallampati I


Teeth:  Normal


Lung:  Normal


Heart:  Normal





ASA Physical Status


ASA physical status:  2


Emergency:  None





Planned Anesthetic


General/MAC:  MAC





Planned Pain Management


Parenteral pain med





Pre-operative Attestations


Prior to commencing anesthesia and surgery, the patient was re-evaluated, there 


was verification of:


*The patient's identity


*The results of appropriate recent lab work and preoperative vital signs


*The above evaluation not changing prior to induction


*Anesthetic plan, risk benefits, alternative and complications discussed with 


patient/family; questions answered; patient/family understands, accepts and 


wishes to proceed.











JESUS PHAN MD            Feb 17, 2019 12:25

## 2019-02-17 NOTE — CONS
Assessment/Plan


Assessment/Plan


Hospital Course (Demo Recall)


ID PROGRESS NOTE 


CURRENT ABX: DAY #  Zosyn 


24H INTERVAL SUMMARY


* POD#0 -> s/p EGD today- spouse says "nothing" was found on EGD. Patient is 


  A/A/O -- feels better, denies pain


* 02/17/19 ABD XR: IMPRESSION:Nonobstructive bowel gas pattern. Enteric tube 


  looped in the left upper quadrant abdomen, likely within the stomach. Residual


  enteric contrast noted within the colon. Two biliary stents are noted.


* 2/15/19 CT ABD-PEL:  IMPRESSION:


   * Mild intrahepatic ductal dilatation in patient with bilateral biliary 


     endoprosthesis.


   * No gallstones visualize. Sheet-like soft tissue surrounding gallbladder and


     within the siria hepatis as seen on recent MRI. Question cholangiocarcinoma


     versus gallbladder cancer versus metastatic adenopathy. Consider biopsy.


   * Vascular calcifications.


   * Left renal cyst.


   * No evidence of urolithiasis, obstructive uropathy, diverticulitis or 


     appendicitis.


   * Small right pleural effusion.


MICRO


* 02/15/19 BCX @ [facility] (-) 


* BCx (+) GNR @ [facility]: Microbiology at the other hospital informs us that his


  blood cultures are positive for Klebsiella species not Klebsiella pneumoniae





PHYSICAL EXAMINATION:


GENERAL: Afebrile, VSS


HEENT: AT, NC, anicteric 


NECK:  Grossly normal 


CHEST: Equal chest rise bilaterally = without dyspnea 


HEART:  Pulse RRR


ABDOMEN:  Deferred, sleeping 


EXTREMITIES:  Warm,


SKIN: No rash, no diaphoresis





ID ASSESSMENT


62 yo M admit with:


(1) SIRS w/Tmax 99.6 on TNS from [facility]


* 02/15/19 BCX @ [facility] (-) 


* BCx (+) GNR @ [facility]: Microbiology at the other hospital informs us that his


  blood cultures are positive for Klebsiella species not Klebsiella pneumoniae


(2) Obstructed, gastric outlet -> S/P EGD 2/17/18 w/report pending transcription


to chart 


* Status:  Acute 


*  Repeat obstruction and stent replaced at [facility] last week


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 


* Onset Date:  ~ 9/2018      Status:  Received 2 rounds of chemotherapy. 


* s/p 12/24/18  ERCP, sphincterotomy, brushings of the malignant stricture of 


  the bile duct, placement of 2 stents, one into the left hepatic duct and one 


  into the right hepatic duct.


(4) Diabetes mellitus type 2 in nonobese


* Status:  Chronic/Stable 


(5) H/O four vessel coronary artery bypass graft


* Onset Date:  ~ 2/2010      Status:  Chronic/asymptomatic 


ABX ALLERGIES: KNDA 


INVASIVES: PIV


CURRENT ABX: DAY # => Zosyn 





ID RECOMMENDATIONS/PLAN:  


1. Continue Zosyn 


2. DC PLANNING: Anticipate total 14 days ABX. May change to PO Cipro/Levaquin/or


Augmentin 875mg poQ12H on days#8-14 if sensitivities can be confirmed. 


* If not sensitive to PO's listed, then consider DC on Ertapenem 1GM IVPB to 


  complete 14 days. 





.





Consultation Date/Type/Reason


Admit Date/Time


Feb 14, 2019 at 18:07


Initial Consult Date





Date/Time of Note


DATE: 2/17/19 


TIME: 15:36





Exam/Review of Systems


Exam


Vitals





Vital Signs


  Date      Temp  Pulse  Resp  B/P (MAP)   Pulse Ox  O2          O2 Flow    FiO2


Time                                                 Delivery    Rate


   2/17/19  97.6     63    18      152/71            Room Air


     14:31                           (98)


   2/17/19                                       99


     13:53








Intake and Output





2/16/19 2/16/19 2/17/19





1515:00


23:00


07:00





IntakeIntake Total


200 ml


1100 ml


1290 ml





OutputOutput Total


900 ml


80 ml


900 ml





BalanceBalance


-700 ml


1020 ml


390 ml














Results


Result Diagram:  


2/17/19 0551                                                                    


           2/17/19 0551





Results 24hrs





Laboratory Tests


Test
                 2/16/19
17:43  2/16/19
21:34  2/17/19
01:37  2/17/19
05:30


Bedside Glucose                88             78             87             84


Test
                 2/17/19
05:51  2/17/19
09:33  2/17/19
14:11  



White Blood Count             5.5


Red Blood Count             3.20  L


Hemoglobin                   9.5  L


Hematocrit                  28.7  L


Mean Corpuscular             89.7


Volume


Mean Corpuscular             29.7


Hemoglobin


Mean Corpuscular            33.1  
  
              
              



Hemoglobin
Concent


Red Cell                    15.5  H


Distribution Width


Platelet Count                371


Mean Platelet Volume          9.7


Immature                   0.600  H


Granulocytes %


Neutrophils %                58.8


Lymphocytes %                25.1


Monocytes %                 14.7  H


Eosinophils %                 0.4


Basophils %                   0.4


Nucleated Red Blood           0.0


Cells %


Immature                    0.030


Granulocytes #


Neutrophils #                 3.2


Lymphocytes #                 1.4


Monocytes #                   0.8


Eosinophils #                 0.0


Basophils #                   0.0


Nucleated Red Blood           0.0


Cells #


Prothrombin Time             13.4


Prothrombin Time              1.0


Ratio


INR International           1.01  
  
              
              



Normalized
Ratio


Activated                   33.1  
  
              
              



Partial
Thromboplast


Time


Sodium Level                  139


Potassium Level              3.4  L


Chloride Level                105


Carbon Dioxide Level           25


Anion Gap                       9


Blood Urea Nitrogen             7


Creatinine                  0.58  L


Est Glomerular        > 60  
        
              
              



Filtrat Rate
mL/min


Glucose Level                  84


Calcium Level                 8.7


Total Bilirubin               0.3


Direct Bilirubin             0.00


Indirect Bilirubin            0.3


Aspartate Amino               26  
  
              
              



Transf
(AST/SGOT)


Alanine                       19  
  
              
              



Aminotransferase
(AL


T/SGPT)


Alkaline Phosphatase           85


Total Protein                5.7  L


Albumin                      2.9  L


Globulin                     2.80


Albumin/Globulin             1.03


Ratio


Bedside Glucose                               93            145








Medications


Medication





Current Medications


Sodium Chloride 1,000 ml @  100 mls/hr Q10H IV  Last administered on 2/17/19at 


04:10; Admin Dose 100 MLS/HR;  Start 2/14/19 at 18:55


IV Flush (NS 3 ml) 3 ml PER PROTOCOL IV ;  Start 2/14/19 at 19:00


Ondansetron HCl (Zofran Inj) 4 mg Q6H  PRN IV NAUSEA/VOMITING;  Start 2/14/19 at


19:00


Acetaminophen (Tylenol Tab) 650 mg Q6H  PRN PO .PAIN 1-3 OR TEMP Last 


administered on 2/17/19at 14:40; Admin Dose 650 MG;  Start 2/14/19 at 19:00


Acetaminophen/ Hydrocodone Bitart (Norco (5/325)) 1 tab Q6H  PRN PO .MOD PAIN 4-


6;  Start 2/14/19 at 19:00


Morphine Sulfate (morphine) 2 mg Q4H  PRN IV .SEVERE PAIN 7-10;  Start 2/14/19 


at 19:00


Heparin Sodium (Porcine) (Heparin  (5000 Units/1ml)) 5,000 unit Q12 SC  Last 


administered on 2/16/19at 21:37; Admin Dose 5,000 UNIT;  Start 2/14/19 at 21:00


Diagnostic Test (Pha) (Accu-Chek) 1 ea 02 XX ;  Start 2/15/19 at 02:00


Insulin Aspart (Novolog Insulin Pen) NOVOLOG *MILD* ALGORI... Q4 SC  Last 


administered on 2/17/19at 14:24; Admin Dose 1 UNIT;  Start 2/14/19 at 21:00


Miscellaneous Information 1 ea NOTE XX ;  Start 2/14/19 at 19:30


Glucose (Glutose) 15 gm Q15M  PRN PO DECREASED GLUCOSE;  Start 2/14/19 at 19:30


Glucose (Glutose) 22.5 gm Q15M  PRN PO DECREASED GLUCOSE;  Start 2/14/19 at 


19:30


Dextrose (D50w Syringe) 25 ml Q15M  PRN IV DECREASED GLUCOSE;  Start 2/14/19 at 


19:30


Dextrose (D50w Syringe) 50 ml Q15M  PRN IV DECREASED GLUCOSE;  Start 2/14/19 at 


19:30


Glucagon (Glucagen) 1 mg Q15M  PRN IM DECREASED GLUCOSE;  Start 2/14/19 at 19:30


Glucose (Glutose) 15 gm Q15M  PRN BUCCAL DECREASED GLUCOSE;  Start 2/14/19 at 


19:30


Piperacillin Sod/ Tazobactam Sod 100 ml @  200 mls/hr Q6 IVPB  Last administered


on 2/17/19at 14:17; Admin Dose 200 MLS/HR;  Start 2/15/19 at 00:30


Bisacodyl (Dulcolax Supp) 10 mg DAILY  PRN [place] CONSTIPATION Last administered on 


2/16/19at 22:16; Admin Dose 10 MG;  Start 2/16/19 at 12:00


Ondansetron HCl (Zofran Inj) 4 mg PACU ORDER PRN IV NAUSEA/VOMITING;  Start 


2/17/19 at 12:30;  Stop 2/17/19 at 17:00


Metoclopramide HCl (Reglan) 10 mg Q6 IV  Last administered on 2/17/19at 14:41; 


Admin Dose 10 MG;  Start 2/17/19 at 13:30











ANTONIO LYNCH NP              Feb 17, 2019 15:45

## 2019-02-18 VITALS — HEART RATE: 62 BPM | SYSTOLIC BLOOD PRESSURE: 138 MMHG | DIASTOLIC BLOOD PRESSURE: 68 MMHG | RESPIRATION RATE: 17 BRPM

## 2019-02-18 VITALS — DIASTOLIC BLOOD PRESSURE: 69 MMHG | RESPIRATION RATE: 16 BRPM | SYSTOLIC BLOOD PRESSURE: 146 MMHG | HEART RATE: 63 BPM

## 2019-02-18 VITALS — SYSTOLIC BLOOD PRESSURE: 164 MMHG | DIASTOLIC BLOOD PRESSURE: 73 MMHG | RESPIRATION RATE: 16 BRPM | HEART RATE: 55 BPM

## 2019-02-18 VITALS — SYSTOLIC BLOOD PRESSURE: 137 MMHG | RESPIRATION RATE: 18 BRPM | DIASTOLIC BLOOD PRESSURE: 63 MMHG | HEART RATE: 60 BPM

## 2019-02-18 VITALS — HEART RATE: 63 BPM | SYSTOLIC BLOOD PRESSURE: 151 MMHG | RESPIRATION RATE: 19 BRPM | DIASTOLIC BLOOD PRESSURE: 67 MMHG

## 2019-02-18 LAB
ADD MAN DIFF?: NO
ALANINE AMINOTRANSFERASE: 26 IU/L (ref 13–69)
ALBUMIN/GLOBULIN RATIO: 0.9
ALBUMIN: 2.7 G/DL (ref 3.3–4.9)
ALKALINE PHOSPHATASE: 221 IU/L (ref 42–121)
ANION GAP: 12 (ref 5–13)
ASPARTATE AMINO TRANSFERASE: 58 IU/L (ref 15–46)
BASOPHIL #: 0 10^3/UL (ref 0–0.1)
BASOPHILS %: 0.4 % (ref 0–2)
BILIRUBIN,DIRECT: 0 MG/DL (ref 0–0.2)
BILIRUBIN,TOTAL: 0.2 MG/DL (ref 0.2–1.3)
BLOOD UREA NITROGEN: 4 MG/DL (ref 7–20)
CALCIUM: 8.5 MG/DL (ref 8.4–10.2)
CARBON DIOXIDE: 24 MMOL/L (ref 21–31)
CHLORIDE: 104 MMOL/L (ref 97–110)
CREATININE: 0.53 MG/DL (ref 0.61–1.24)
EOSINOPHILS #: 0.1 10^3/UL (ref 0–0.5)
EOSINOPHILS %: 1 % (ref 0–7)
GLOBULIN: 3 G/DL (ref 1.3–3.2)
GLUCOSE: 140 MG/DL (ref 70–220)
HEMATOCRIT: 28.3 % (ref 42–52)
HEMOGLOBIN: 9.6 G/DL (ref 14–18)
IMMATURE GRANS #M: 0.04 10^3/UL (ref 0–0.03)
IMMATURE GRANS % (M): 0.8 % (ref 0–0.43)
LYMPHOCYTES #: 1.4 10^3/UL (ref 0.8–2.9)
LYMPHOCYTES %: 26.8 % (ref 15–51)
MEAN CORPUSCULAR HEMOGLOBIN: 29.7 PG (ref 29–33)
MEAN CORPUSCULAR HGB CONC: 33.9 G/DL (ref 32–37)
MEAN CORPUSCULAR VOLUME: 87.6 FL (ref 82–101)
MEAN PLATELET VOLUME: 9.1 FL (ref 7.4–10.4)
MONOCYTE #: 0.7 10^3/UL (ref 0.3–0.9)
MONOCYTES %: 14.1 % (ref 0–11)
NEUTROPHIL #: 2.9 10^3/UL (ref 1.6–7.5)
NEUTROPHILS %: 56.9 % (ref 39–77)
NUCLEATED RED BLOOD CELLS #: 0 10^3/UL (ref 0–0)
NUCLEATED RED BLOOD CELLS%: 0 /100WBC (ref 0–0)
PLATELET COUNT: 340 10^3/UL (ref 140–415)
POTASSIUM: 3.5 MMOL/L (ref 3.5–5.1)
RED BLOOD COUNT: 3.23 10^6/UL (ref 4.7–6.1)
RED CELL DISTRIBUTION WIDTH: 15.2 % (ref 11.5–14.5)
SODIUM: 140 MMOL/L (ref 135–144)
TOTAL PROTEIN: 5.7 G/DL (ref 6.1–8.1)
WHITE BLOOD COUNT: 5 10^3/UL (ref 4.8–10.8)

## 2019-02-18 RX ADMIN — METOCLOPRAMIDE HYDROCHLORIDE SCH MG: 10 INJECTION, SOLUTION INTRAMUSCULAR; INTRAVENOUS at 18:20

## 2019-02-18 RX ADMIN — METOCLOPRAMIDE HYDROCHLORIDE SCH MG: 10 INJECTION, SOLUTION INTRAMUSCULAR; INTRAVENOUS at 05:30

## 2019-02-18 RX ADMIN — FOLIC ACID SCH MLS/HR: 5 INJECTION, SOLUTION INTRAMUSCULAR; INTRAVENOUS; SUBCUTANEOUS at 21:09

## 2019-02-18 RX ADMIN — PIPERACILLIN SODIUM AND TAZOBACTAM SODIUM SCH MLS/HR: 3; .375 INJECTION, POWDER, LYOPHILIZED, FOR SOLUTION INTRAVENOUS at 00:02

## 2019-02-18 RX ADMIN — THIAMINE HYDROCHLORIDE 1 MLS/HR: 100 INJECTION, SOLUTION INTRAMUSCULAR; INTRAVENOUS at 07:15

## 2019-02-18 RX ADMIN — METOCLOPRAMIDE HYDROCHLORIDE SCH MG: 10 INJECTION, SOLUTION INTRAMUSCULAR; INTRAVENOUS at 00:02

## 2019-02-18 RX ADMIN — METOCLOPRAMIDE HYDROCHLORIDE 1 MG: 10 INJECTION, SOLUTION INTRAMUSCULAR; INTRAVENOUS at 00:02

## 2019-02-18 RX ADMIN — ZOLPIDEM TARTRATE PRN MG: 5 TABLET, FILM COATED ORAL at 21:03

## 2019-02-18 RX ADMIN — FOLIC ACID SCH MLS/HR: 5 INJECTION, SOLUTION INTRAMUSCULAR; INTRAVENOUS; SUBCUTANEOUS at 12:55

## 2019-02-18 RX ADMIN — METOCLOPRAMIDE HYDROCHLORIDE 1 MG: 10 INJECTION, SOLUTION INTRAMUSCULAR; INTRAVENOUS at 18:20

## 2019-02-18 RX ADMIN — PIPERACILLIN SODIUM AND TAZOBACTAM SODIUM SCH MLS/HR: 3; .375 INJECTION, POWDER, LYOPHILIZED, FOR SOLUTION INTRAVENOUS at 18:19

## 2019-02-18 RX ADMIN — ZOLPIDEM TARTRATE 1 MG: 5 TABLET, FILM COATED ORAL at 21:03

## 2019-02-18 RX ADMIN — THIAMINE HYDROCHLORIDE 1 MLS/HR: 100 INJECTION, SOLUTION INTRAMUSCULAR; INTRAVENOUS at 12:55

## 2019-02-18 RX ADMIN — FOLIC ACID SCH MLS/HR: 5 INJECTION, SOLUTION INTRAMUSCULAR; INTRAVENOUS; SUBCUTANEOUS at 07:15

## 2019-02-18 RX ADMIN — PIPERACILLIN SODIUM AND TAZOBACTAM SODIUM 1 MLS/HR: 3; .375 INJECTION, POWDER, LYOPHILIZED, FOR SOLUTION INTRAVENOUS at 05:30

## 2019-02-18 RX ADMIN — HEPARIN SODIUM 1 UNIT: 5000 INJECTION, SOLUTION INTRAVENOUS; SUBCUTANEOUS at 21:06

## 2019-02-18 RX ADMIN — HEPARIN SODIUM 1 UNIT: 5000 INJECTION, SOLUTION INTRAVENOUS; SUBCUTANEOUS at 08:46

## 2019-02-18 RX ADMIN — HEPARIN SODIUM SCH UNIT: 5000 INJECTION, SOLUTION INTRAVENOUS; SUBCUTANEOUS at 21:06

## 2019-02-18 RX ADMIN — INSULIN ASPART 1 UNIT: 100 INJECTION, SOLUTION INTRAVENOUS; SUBCUTANEOUS at 08:46

## 2019-02-18 RX ADMIN — HEPARIN SODIUM SCH UNIT: 5000 INJECTION, SOLUTION INTRAVENOUS; SUBCUTANEOUS at 08:46

## 2019-02-18 RX ADMIN — METOCLOPRAMIDE HYDROCHLORIDE 1 MG: 10 INJECTION, SOLUTION INTRAMUSCULAR; INTRAVENOUS at 12:54

## 2019-02-18 RX ADMIN — INSULIN ASPART 1 UNIT: 100 INJECTION, SOLUTION INTRAVENOUS; SUBCUTANEOUS at 13:08

## 2019-02-18 RX ADMIN — PIPERACILLIN SODIUM AND TAZOBACTAM SODIUM SCH MLS/HR: 3; .375 INJECTION, POWDER, LYOPHILIZED, FOR SOLUTION INTRAVENOUS at 05:30

## 2019-02-18 RX ADMIN — PIPERACILLIN SODIUM AND TAZOBACTAM SODIUM 1 MLS/HR: 3; .375 INJECTION, POWDER, LYOPHILIZED, FOR SOLUTION INTRAVENOUS at 00:02

## 2019-02-18 RX ADMIN — INSULIN ASPART 1 UNIT: 100 INJECTION, SOLUTION INTRAVENOUS; SUBCUTANEOUS at 17:49

## 2019-02-18 RX ADMIN — PIPERACILLIN SODIUM AND TAZOBACTAM SODIUM 1 MLS/HR: 3; .375 INJECTION, POWDER, LYOPHILIZED, FOR SOLUTION INTRAVENOUS at 18:19

## 2019-02-18 RX ADMIN — PIPERACILLIN SODIUM AND TAZOBACTAM SODIUM 1 MLS/HR: 3; .375 INJECTION, POWDER, LYOPHILIZED, FOR SOLUTION INTRAVENOUS at 12:54

## 2019-02-18 RX ADMIN — PIPERACILLIN SODIUM AND TAZOBACTAM SODIUM SCH MLS/HR: 3; .375 INJECTION, POWDER, LYOPHILIZED, FOR SOLUTION INTRAVENOUS at 12:54

## 2019-02-18 RX ADMIN — METOCLOPRAMIDE HYDROCHLORIDE SCH MG: 10 INJECTION, SOLUTION INTRAMUSCULAR; INTRAVENOUS at 12:54

## 2019-02-18 RX ADMIN — THIAMINE HYDROCHLORIDE 1 MLS/HR: 100 INJECTION, SOLUTION INTRAMUSCULAR; INTRAVENOUS at 21:09

## 2019-02-18 RX ADMIN — METOCLOPRAMIDE HYDROCHLORIDE 1 MG: 10 INJECTION, SOLUTION INTRAMUSCULAR; INTRAVENOUS at 05:30

## 2019-02-18 RX ADMIN — INSULIN ASPART 1 UNIT: 100 INJECTION, SOLUTION INTRAVENOUS; SUBCUTANEOUS at 21:00

## 2019-02-18 NOTE — CONS
Assessment/Plan


Assessment/Plan


Hospital Course (Demo Recall)


ID PROGRESS NOTE 


CURRENT ABX: DAY #  Zosyn 


24H INTERVAL SUMMARY


* OFF FLOOR TO RADIOLOGY FOR REPEAT IMAGING == CHART REVIEWED = NO NEW ISSUES 


  REPORTED


* POD#1 -> s/p EGD today


* 02/17/19 ABD XR: IMPRESSION:Nonobstructive bowel gas pattern. Enteric tube 


  looped in the left upper quadrant abdomen, likely within the stomach. Residual


  enteric contrast noted within the colon. Two biliary stents are noted.


* 2/15/19 CT ABD-PEL:  IMPRESSION:


   * Mild intrahepatic ductal dilatation in patient with bilateral biliary 


     endoprosthesis.


   * No gallstones visualize. Sheet-like soft tissue surrounding gallbladder and


     within the siria hepatis as seen on recent MRI. Question cholangiocarcinoma


     versus gallbladder cancer versus metastatic adenopathy. Consider biopsy.


   * Vascular calcifications.


   * Left renal cyst.


   * No evidence of urolithiasis, obstructive uropathy, diverticulitis or 


     appendicitis.


   * Small right pleural effusion.


MICRO


* 02/15/19 BCX @ [facility] (-) 


* BCx (+) GNR @ [facility]: Microbiology at the other hospital informs us that his


  blood cultures are positive for Klebsiella species not Klebsiella pneumoniae





PHYSICAL EXAMINATION == RECORDED FROM YESTERDAY'S EXAM == EXAM DEFERRED TODAY AS


PT IS OFF THE FLOOR 


GENERAL: Afebrile, VSS








ID ASSESSMENT


62 yo M admit with:


(1) SIRS w/Tmax 99.6 on TNS from [facility]


* 02/15/19 BCX @ [facility] (-) 


* BCx (+) GNR @ [facility]: Microbiology at the other hospital informs us that his


  blood cultures are positive for Klebsiella species not Klebsiella pneumoniae


(2) Obstructed, gastric outlet -> S/P EGD 2/17/18 w/report pending transcription


to chart 


* Status:  Acute 


*  Repeat obstruction and stent replaced at [facility] last week


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 


* Onset Date:  ~ 9/2018      Status:  Received 2 rounds of chemotherapy. 


* s/p 12/24/18  ERCP, sphincterotomy, brushings of the malignant stricture of 


  the bile duct, placement of 2 stents, one into the left hepatic duct and one 


  into the right hepatic duct.


(4) Diabetes mellitus type 2 in nonobese


* Status:  Chronic/Stable 


(5) H/O four vessel coronary artery bypass graft


* Onset Date:  ~ 2/2010      Status:  Chronic/asymptomatic 


ABX ALLERGIES: KNDA 


INVASIVES: PIV


CURRENT ABX: DAY # => Zosyn 





ID RECOMMENDATIONS/PLAN:  


1. Continue Zosyn 


2. DC PLANNING: Anticipate total 14 days ABX. May change to PO Cipro/Levaquin/or


Augmentin 875mg poQ12H on days#8-14 if sensitivities can be confirmed. 


* If not sensitive to PO's listed, then consider DC on Ertapenem 1GM IVPB to 


  complete 14 days. 





.





Consultation Date/Type/Reason


Admit Date/Time


Feb 14, 2019 at 18:07


Initial Consult Date





Date/Time of Note


DATE: 2/18/19 


TIME: 15:25





Exam/Review of Systems


Exam


Vitals





Vital Signs


  Date      Temp  Pulse  Resp  B/P (MAP)   Pulse Ox  O2          O2 Flow    FiO2


Time                                                 Delivery    Rate


   2/18/19  97.8     63    19      151/67        97  Room Air


     14:12                           (95)








Intake and Output





2/17/19 2/17/19 2/18/19





1515:00


23:00


07:00





IntakeIntake Total


720 ml


2410 ml


350 ml





OutputOutput Total


1105 ml


350 ml


300 ml





BalanceBalance


-385 ml


2060 ml


50 ml














Results


Result Diagram:  


2/18/19 0447                                                                    


           2/18/19 0447





Results 24hrs





Laboratory Tests


Test
                 2/17/19
17:38  2/17/19
20:22  2/18/19
04:47  2/18/19
08:30


Bedside Glucose               135            165                           148


White Blood Count                                           5.0


Red Blood Count                                           3.23  L


Hemoglobin                                                 9.6  L


Hematocrit                                                28.3  L


Mean Corpuscular                                           87.6


Volume


Mean Corpuscular                                           29.7


Hemoglobin


Mean Corpuscular      
              
                    33.9  
  



Hemoglobin
Concent


Red Cell                                                  15.2  H


Distribution Width


Platelet Count                                              340


Mean Platelet Volume                                        9.1


Immature                                                 0.800  H


Granulocytes %


Neutrophils %                                              56.9


Lymphocytes %                                              26.8


Monocytes %                                               14.1  H


Eosinophils %                                               1.0


Basophils %                                                 0.4


Nucleated Red Blood                                         0.0


Cells %


Immature                                                 0.040  H


Granulocytes #


Neutrophils #                                               2.9


Lymphocytes #                                               1.4


Monocytes #                                                 0.7


Eosinophils #                                               0.1


Basophils #                                                 0.0


Nucleated Red Blood                                         0.0


Cells #


Sodium Level                                                140


Potassium Level                                             3.5


Chloride Level                                              104


Carbon Dioxide Level                                         24


Anion Gap                                                    12


Blood Urea Nitrogen                                          4  L


Creatinine                                                0.53  L


Est Glomerular        
              
              > 60  
        



Filtrat Rate
mL/min


Glucose Level                                              140  #


Calcium Level                                               8.5


Total Bilirubin                                             0.2


Direct Bilirubin                                           0.00


Indirect Bilirubin                                          0.2


Aspartate Amino       
              
                    58  #H
  



Transf
(AST/SGOT)


Alanine               
              
                      26  
  



Aminotransferase
(AL


T/SGPT)


Alkaline Phosphatase                                      221  #H


Total Protein                                              5.7  L


Albumin                                                    2.7  L


Globulin                                                   3.00


Albumin/Globulin                                           0.90


Ratio


Test
                 2/18/19
13:03  
              
              



Bedside Glucose               158








Medications


Medication





Current Medications


Sodium Chloride 1,000 ml @  100 mls/hr Q10H IV  Last administered on 2/18/19at 


07:15; Admin Dose 100 MLS/HR;  Start 2/14/19 at 18:55


IV Flush (NS 3 ml) 3 ml PER PROTOCOL IV ;  Start 2/14/19 at 19:00


Ondansetron HCl (Zofran Inj) 4 mg Q6H  PRN IV NAUSEA/VOMITING;  Start 2/14/19 at


19:00


Acetaminophen (Tylenol Tab) 650 mg Q6H  PRN PO .PAIN 1-3 OR TEMP Last 


administered on 2/17/19at 14:40; Admin Dose 650 MG;  Start 2/14/19 at 19:00


Acetaminophen/ Hydrocodone Bitart (Norco (5/325)) 1 tab Q6H  PRN PO .MOD PAIN 4-


6;  Start 2/14/19 at 19:00


Morphine Sulfate (morphine) 2 mg Q4H  PRN IV .SEVERE PAIN 7-10;  Start 2/14/19 


at 19:00


Heparin Sodium (Porcine) (Heparin  (5000 Units/1ml)) 5,000 unit Q12 SC  Last 


administered on 2/18/19at 08:46; Admin Dose 5,000 UNIT;  Start 2/14/19 at 21:00


Diagnostic Test (Pha) (Accu-Chek) 1 ea 02 XX ;  Start 2/15/19 at 02:00


Miscellaneous Information 1 ea NOTE XX ;  Start 2/14/19 at 19:30


Glucose (Glutose) 15 gm Q15M  PRN PO DECREASED GLUCOSE;  Start 2/14/19 at 19:30


Glucose (Glutose) 22.5 gm Q15M  PRN PO DECREASED GLUCOSE;  Start 2/14/19 at 


19:30


Dextrose (D50w Syringe) 25 ml Q15M  PRN IV DECREASED GLUCOSE;  Start 2/14/19 at 


19:30


Dextrose (D50w Syringe) 50 ml Q15M  PRN IV DECREASED GLUCOSE;  Start 2/14/19 at 


19:30


Glucagon (Glucagen) 1 mg Q15M  PRN IM DECREASED GLUCOSE;  Start 2/14/19 at 19:30


Glucose (Glutose) 15 gm Q15M  PRN BUCCAL DECREASED GLUCOSE;  Start 2/14/19 at 


19:30


Piperacillin Sod/ Tazobactam Sod 100 ml @  200 mls/hr Q6 IVPB  Last administered


on 2/18/19at 12:54; Admin Dose 200 MLS/HR;  Start 2/15/19 at 00:30


Bisacodyl (Dulcolax Supp) 10 mg DAILY  PRN AR CONSTIPATION Last administered on 


2/16/19at 22:16; Admin Dose 10 MG;  Start 2/16/19 at 12:00


Metoclopramide HCl (Reglan) 10 mg Q6 IV  Last administered on 2/18/19at 12:54; 


Admin Dose 10 MG;  Start 2/17/19 at 13:30


Insulin Aspart (Novolog Insulin Pen) NOVOLOG *MILD* ALGORITHM WITH MEALS  


BEDTIME SC  Last administered on 2/18/19at 13:08; Admin Dose 1 UNIT;  Start 


2/17/19 at 21:00











ANTONIO LYNCH NP              Feb 18, 2019 15:27

## 2019-02-18 NOTE — PN
Date/Time of Note


Date/Time of Note


DATE: 2/18/19 


TIME: 13:30





Assessment/Plan


VTE Prophylaxis


Risk score (from Ns)>0 risk:  4


SCD applied (from Ns):  Yes


Pharmacological prophylaxis:  heparin





Lines/Catheters


IV Catheter Type (from [facility]):  Peripheral IV


Urinary Cath still in place:  No





Assessment/Plan


Hospital Course


62 yo male with h/o cholangiocarcinoma with biliary obstruction.  s/p ERCP with 


stent placed here.  Then repeat obstruction and stent replaced at [facility] last 


week.  Has received 2 doses chemo.  Now returns with inability to tolerate PO 


and sepsis 


- EGD showed no evidence of obstruction, suggestive of gastroparesis


- UGIS, gastric empyting study are pending


- [facility]





Cholangiocarcinoma:


- Dr Guido contacted





GNR bacteremia with sepsis:


- Continue zoysn.  ID consultation


- Follow cultures





DMII:


- insulin scale





Discharge when stable


Result Diagram:  


2/18/19 0447 2/18/19 0447





Results 24hrs





Laboratory Tests


Test
                 2/17/19
14:11  2/17/19
17:38  2/17/19
20:22  2/18/19
04:47


Bedside Glucose               145            135            165


White Blood Count                                                          5.0


Red Blood Count                                                          3.23  L


Hemoglobin                                                                9.6  L


Hematocrit                                                               28.3  L


Mean Corpuscular                                                          87.6


Volume


Mean Corpuscular                                                          29.7


Hemoglobin


Mean Corpuscular      
              
              
                    33.9  



Hemoglobin
Concent


Red Cell                                                                 15.2  H


Distribution Width


Platelet Count                                                             340


Mean Platelet Volume                                                       9.1


Immature                                                                0.800  H


Granulocytes %


Neutrophils %                                                             56.9


Lymphocytes %                                                             26.8


Monocytes %                                                              14.1  H


Eosinophils %                                                              1.0


Basophils %                                                                0.4


Nucleated Red Blood                                                        0.0


Cells %


Immature                                                                0.040  H


Granulocytes #


Neutrophils #                                                              2.9


Lymphocytes #                                                              1.4


Monocytes #                                                                0.7


Eosinophils #                                                              0.1


Basophils #                                                                0.0


Nucleated Red Blood                                                        0.0


Cells #


Sodium Level                                                               140


Potassium Level                                                            3.5


Chloride Level                                                             104


Carbon Dioxide Level                                                        24


Anion Gap                                                                   12


Blood Urea Nitrogen                                                         4  L


Creatinine                                                               0.53  L


Est Glomerular        
              
              
              > 60  



Filtrat Rate
mL/min


Glucose Level                                                             140  #


Calcium Level                                                              8.5


Total Bilirubin                                                            0.2


Direct Bilirubin                                                          0.00


Indirect Bilirubin                                                         0.2


Aspartate Amino       
              
              
                    58  #H



Transf
(AST/SGOT)


Alanine               
              
              
                      26  



Aminotransferase
(AL


T/SGPT)


Alkaline Phosphatase                                                     221  #H


Total Protein                                                             5.7  L


Albumin                                                                   2.7  L


Globulin                                                                  3.00


Albumin/Globulin                                                          0.90


Ratio


Test
                 2/18/19
08:30  2/18/19
13:03  
              



Bedside Glucose               148            158








Subjective


24 Hr Interval Summary


Free Text/Dictation


Tolerating some PO


No pain


Awiaitng UGIS and NM study





Exam/Review of Systems


Exam


Vitals





Vital Signs


  Date      Temp  Pulse  Resp  B/P (MAP)   Pulse Ox  O2          O2 Flow    FiO2


Time                                                 Delivery    Rate


   2/18/19  97.9     63    16      146/69        99


     07:43                           (94)


   2/18/19                                           Room Air


     01:58








Intake and Output





2/17/19 2/17/19 2/18/19





1515:00


23:00


07:00





IntakeIntake Total


720 ml


2410 ml


350 ml





OutputOutput Total


1105 ml


350 ml


300 ml





BalanceBalance


-385 ml


2060 ml


50 ml











Constitutional:  alert, oriented, well developed


Psych:  no complaints, nl mood/affect


Head:  normocephalic, atraumatic


Eyes:  nl conjunctiva, EOMI, nl lids, nl sclera, PERRL


ENMT:  nl external ears & nose, nl lips & teeth, nl nasal mucosa & septum


Neck:  supple, non-tender


Respiratory:  clear to auscultation, normal air movement


Cardiovascular:  regular rate and rhythm, nl pulses


Gastrointestinal:  soft, nl liver, spleen, non-tender


Musculoskeletal:  nl extremities to inspection, nl gait and stance


Extremities:  normal pulses


Neurological:  CNS II-XII intact, nl mental status, nl speech, nl strength


Skin:  nl turgor; 


   No rash or lesions


Lymph:  nl lymph nodes





Results


Results 24hrs





Laboratory Tests


Test
                 2/17/19
14:11  2/17/19
17:38  2/17/19
20:22  2/18/19
04:47


Bedside Glucose               145            135            165


White Blood Count                                                          5.0


Red Blood Count                                                          3.23  L


Hemoglobin                                                                9.6  L


Hematocrit                                                               28.3  L


Mean Corpuscular                                                          87.6


Volume


Mean Corpuscular                                                          29.7


Hemoglobin


Mean Corpuscular      
              
              
                    33.9  



Hemoglobin
Concent


Red Cell                                                                 15.2  H


Distribution Width


Platelet Count                                                             340


Mean Platelet Volume                                                       9.1


Immature                                                                0.800  H


Granulocytes %


Neutrophils %                                                             56.9


Lymphocytes %                                                             26.8


Monocytes %                                                              14.1  H


Eosinophils %                                                              1.0


Basophils %                                                                0.4


Nucleated Red Blood                                                        0.0


Cells %


Immature                                                                0.040  H


Granulocytes #


Neutrophils #                                                              2.9


Lymphocytes #                                                              1.4


Monocytes #                                                                0.7


Eosinophils #                                                              0.1


Basophils #                                                                0.0


Nucleated Red Blood                                                        0.0


Cells #


Sodium Level                                                               140


Potassium Level                                                            3.5


Chloride Level                                                             104


Carbon Dioxide Level                                                        24


Anion Gap                                                                   12


Blood Urea Nitrogen                                                         4  L


Creatinine                                                               0.53  L


Est Glomerular        
              
              
              > 60  



Filtrat Rate
mL/min


Glucose Level                                                             140  #


Calcium Level                                                              8.5


Total Bilirubin                                                            0.2


Direct Bilirubin                                                          0.00


Indirect Bilirubin                                                         0.2


Aspartate Amino       
              
              
                    58  #H



Transf
(AST/SGOT)


Alanine               
              
              
                      26  



Aminotransferase
(AL


T/SGPT)


Alkaline Phosphatase                                                     221  #H


Total Protein                                                             5.7  L


Albumin                                                                   2.7  L


Globulin                                                                  3.00


Albumin/Globulin                                                          0.90


Ratio


Test
                 2/18/19
08:30  2/18/19
13:03  
              



Bedside Glucose               148            158








Medications


Medication





Current Medications


Sodium Chloride 1,000 ml @  100 mls/hr Q10H IV  Last administered on 2/18/19at 


07:15; Admin Dose 100 MLS/HR;  Start 2/14/19 at 18:55


IV Flush (NS 3 ml) 3 ml PER PROTOCOL IV ;  Start 2/14/19 at 19:00


Ondansetron HCl (Zofran Inj) 4 mg Q6H  PRN IV NAUSEA/VOMITING;  Start 2/14/19 at


19:00


Acetaminophen (Tylenol Tab) 650 mg Q6H  PRN PO .PAIN 1-3 OR TEMP Last 


administered on 2/17/19at 14:40; Admin Dose 650 MG;  Start 2/14/19 at 19:00


Acetaminophen/ Hydrocodone Bitart (Norco (5/325)) 1 tab Q6H  PRN PO .MOD PAIN 4-


6;  Start 2/14/19 at 19:00


Morphine Sulfate (morphine) 2 mg Q4H  PRN IV .SEVERE PAIN 7-10;  Start 2/14/19 


at 19:00


Heparin Sodium (Porcine) (Heparin  (5000 Units/1ml)) 5,000 unit Q12 SC  Last 


administered on 2/18/19at 08:46; Admin Dose 5,000 UNIT;  Start 2/14/19 at 21:00


Diagnostic Test (Pha) (Accu-Chek) 1 ea 02 XX ;  Start 2/15/19 at 02:00


Miscellaneous Information 1 ea NOTE XX ;  Start 2/14/19 at 19:30


Glucose (Glutose) 15 gm Q15M  PRN PO DECREASED GLUCOSE;  Start 2/14/19 at 19:30


Glucose (Glutose) 22.5 gm Q15M  PRN PO DECREASED GLUCOSE;  Start 2/14/19 at 


19:30


Dextrose (D50w Syringe) 25 ml Q15M  PRN IV DECREASED GLUCOSE;  Start 2/14/19 at 


19:30


Dextrose (D50w Syringe) 50 ml Q15M  PRN IV DECREASED GLUCOSE;  Start 2/14/19 at 


19:30


Glucagon (Glucagen) 1 mg Q15M  PRN IM DECREASED GLUCOSE;  Start 2/14/19 at 19:30


Glucose (Glutose) 15 gm Q15M  PRN BUCCAL DECREASED GLUCOSE;  Start 2/14/19 at 


19:30


Piperacillin Sod/ Tazobactam Sod 100 ml @  200 mls/hr Q6 IVPB  Last administered


on 2/18/19at 12:54; Admin Dose 200 MLS/HR;  Start 2/15/19 at 00:30


Bisacodyl (Dulcolax Supp) 10 mg DAILY  PRN GA CONSTIPATION Last administered on 


2/16/19at 22:16; Admin Dose 10 MG;  Start 2/16/19 at 12:00


Metoclopramide HCl (Reglan) 10 mg Q6 IV  Last administered on 2/18/19at 12:54; 


Admin Dose 10 MG;  Start 2/17/19 at 13:30


Insulin Aspart (Novolog Insulin Pen) NOVOLOG *MILD* ALGORITHM WITH MEALS  


BEDTIME SC  Last administered on 2/18/19at 13:08; Admin Dose 1 UNIT;  Start 


2/17/19 at 21:00











REJI EVANS MD          Feb 18, 2019 13:31

## 2019-02-18 NOTE — PAC
Date/Time of Note


Date/Time of Note


DATE: 2/18/19 


TIME: 08:00





Post-Anesthesia Notes


Post-Anesthesia Note


Last documented vital signs





Vital Signs


  Date      Temp  Pulse  Resp  B/P (MAP)   Pulse Ox  O2          O2 Flow    FiO2


Time                                                 Delivery    Rate


   2/18/19  97.9     63    16      146/69        99


     07:43                           (94)


   2/18/19                                           Room Air


     01:58





Activity:  WNL


Respiratory function:  WNL


Cardiovascular function:  WNL


Mental status:  Baseline


Pain reasonably controlled:  Yes


Hydration appropriate:  Yes


Nausea/Vomiting absent:  No











JESUS PHAN MD            Feb 18, 2019 08:00

## 2019-02-19 VITALS — HEART RATE: 57 BPM | SYSTOLIC BLOOD PRESSURE: 154 MMHG | DIASTOLIC BLOOD PRESSURE: 74 MMHG | RESPIRATION RATE: 16 BRPM

## 2019-02-19 VITALS — HEART RATE: 57 BPM | SYSTOLIC BLOOD PRESSURE: 149 MMHG | DIASTOLIC BLOOD PRESSURE: 70 MMHG | RESPIRATION RATE: 17 BRPM

## 2019-02-19 VITALS — DIASTOLIC BLOOD PRESSURE: 74 MMHG | RESPIRATION RATE: 18 BRPM | HEART RATE: 63 BPM | SYSTOLIC BLOOD PRESSURE: 155 MMHG

## 2019-02-19 VITALS — RESPIRATION RATE: 18 BRPM | SYSTOLIC BLOOD PRESSURE: 119 MMHG | DIASTOLIC BLOOD PRESSURE: 62 MMHG | HEART RATE: 74 BPM

## 2019-02-19 RX ADMIN — PIPERACILLIN SODIUM AND TAZOBACTAM SODIUM 1 MLS/HR: 3; .375 INJECTION, POWDER, LYOPHILIZED, FOR SOLUTION INTRAVENOUS at 12:44

## 2019-02-19 RX ADMIN — PIPERACILLIN SODIUM AND TAZOBACTAM SODIUM SCH MLS/HR: 3; .375 INJECTION, POWDER, LYOPHILIZED, FOR SOLUTION INTRAVENOUS at 00:10

## 2019-02-19 RX ADMIN — INSULIN ASPART 1 UNIT: 100 INJECTION, SOLUTION INTRAVENOUS; SUBCUTANEOUS at 12:53

## 2019-02-19 RX ADMIN — INSULIN ASPART 1 UNIT: 100 INJECTION, SOLUTION INTRAVENOUS; SUBCUTANEOUS at 18:10

## 2019-02-19 RX ADMIN — ZOLPIDEM TARTRATE PRN MG: 5 TABLET, FILM COATED ORAL at 00:15

## 2019-02-19 RX ADMIN — ZOLPIDEM TARTRATE 1 MG: 5 TABLET, FILM COATED ORAL at 00:15

## 2019-02-19 RX ADMIN — PIPERACILLIN SODIUM AND TAZOBACTAM SODIUM 1 MLS/HR: 3; .375 INJECTION, POWDER, LYOPHILIZED, FOR SOLUTION INTRAVENOUS at 00:10

## 2019-02-19 RX ADMIN — METOCLOPRAMIDE HYDROCHLORIDE SCH MG: 10 INJECTION, SOLUTION INTRAMUSCULAR; INTRAVENOUS at 06:11

## 2019-02-19 RX ADMIN — PIPERACILLIN SODIUM AND TAZOBACTAM SODIUM SCH MLS/HR: 3; .375 INJECTION, POWDER, LYOPHILIZED, FOR SOLUTION INTRAVENOUS at 06:11

## 2019-02-19 RX ADMIN — METOCLOPRAMIDE HYDROCHLORIDE 1 MG: 10 INJECTION, SOLUTION INTRAMUSCULAR; INTRAVENOUS at 17:42

## 2019-02-19 RX ADMIN — METOCLOPRAMIDE HYDROCHLORIDE SCH MG: 10 INJECTION, SOLUTION INTRAMUSCULAR; INTRAVENOUS at 17:42

## 2019-02-19 RX ADMIN — FOLIC ACID SCH MLS/HR: 5 INJECTION, SOLUTION INTRAMUSCULAR; INTRAVENOUS; SUBCUTANEOUS at 12:44

## 2019-02-19 RX ADMIN — PIPERACILLIN SODIUM AND TAZOBACTAM SODIUM SCH MLS/HR: 3; .375 INJECTION, POWDER, LYOPHILIZED, FOR SOLUTION INTRAVENOUS at 12:44

## 2019-02-19 RX ADMIN — METOCLOPRAMIDE HYDROCHLORIDE 1 MG: 10 INJECTION, SOLUTION INTRAMUSCULAR; INTRAVENOUS at 12:44

## 2019-02-19 RX ADMIN — METOCLOPRAMIDE HYDROCHLORIDE SCH MG: 10 INJECTION, SOLUTION INTRAMUSCULAR; INTRAVENOUS at 23:42

## 2019-02-19 RX ADMIN — HEPARIN SODIUM SCH UNIT: 5000 INJECTION, SOLUTION INTRAVENOUS; SUBCUTANEOUS at 20:33

## 2019-02-19 RX ADMIN — HEPARIN SODIUM 1 UNIT: 5000 INJECTION, SOLUTION INTRAVENOUS; SUBCUTANEOUS at 08:10

## 2019-02-19 RX ADMIN — METOCLOPRAMIDE HYDROCHLORIDE 1 MG: 10 INJECTION, SOLUTION INTRAMUSCULAR; INTRAVENOUS at 00:10

## 2019-02-19 RX ADMIN — METOCLOPRAMIDE HYDROCHLORIDE SCH MG: 10 INJECTION, SOLUTION INTRAMUSCULAR; INTRAVENOUS at 00:10

## 2019-02-19 RX ADMIN — PIPERACILLIN SODIUM AND TAZOBACTAM SODIUM 1 MLS/HR: 3; .375 INJECTION, POWDER, LYOPHILIZED, FOR SOLUTION INTRAVENOUS at 17:42

## 2019-02-19 RX ADMIN — HEPARIN SODIUM 1 UNIT: 5000 INJECTION, SOLUTION INTRAVENOUS; SUBCUTANEOUS at 20:33

## 2019-02-19 RX ADMIN — PIPERACILLIN SODIUM AND TAZOBACTAM SODIUM SCH MLS/HR: 3; .375 INJECTION, POWDER, LYOPHILIZED, FOR SOLUTION INTRAVENOUS at 23:42

## 2019-02-19 RX ADMIN — MORPHINE SULFATE 1 MG: 2 INJECTION, SOLUTION INTRAMUSCULAR; INTRAVENOUS at 08:05

## 2019-02-19 RX ADMIN — ZOLPIDEM TARTRATE 1 MG: 5 TABLET, FILM COATED ORAL at 21:25

## 2019-02-19 RX ADMIN — PIPERACILLIN SODIUM AND TAZOBACTAM SODIUM 1 MLS/HR: 3; .375 INJECTION, POWDER, LYOPHILIZED, FOR SOLUTION INTRAVENOUS at 06:11

## 2019-02-19 RX ADMIN — PIPERACILLIN SODIUM AND TAZOBACTAM SODIUM 1 MLS/HR: 3; .375 INJECTION, POWDER, LYOPHILIZED, FOR SOLUTION INTRAVENOUS at 23:42

## 2019-02-19 RX ADMIN — PIPERACILLIN SODIUM AND TAZOBACTAM SODIUM SCH MLS/HR: 3; .375 INJECTION, POWDER, LYOPHILIZED, FOR SOLUTION INTRAVENOUS at 17:42

## 2019-02-19 RX ADMIN — METOCLOPRAMIDE HYDROCHLORIDE 1 MG: 10 INJECTION, SOLUTION INTRAMUSCULAR; INTRAVENOUS at 06:11

## 2019-02-19 RX ADMIN — FOLIC ACID SCH MLS/HR: 5 INJECTION, SOLUTION INTRAMUSCULAR; INTRAVENOUS; SUBCUTANEOUS at 18:55

## 2019-02-19 RX ADMIN — THIAMINE HYDROCHLORIDE 1 MLS/HR: 100 INJECTION, SOLUTION INTRAMUSCULAR; INTRAVENOUS at 12:44

## 2019-02-19 RX ADMIN — METOCLOPRAMIDE HYDROCHLORIDE SCH MG: 10 INJECTION, SOLUTION INTRAMUSCULAR; INTRAVENOUS at 12:44

## 2019-02-19 RX ADMIN — INSULIN ASPART 1 UNIT: 100 INJECTION, SOLUTION INTRAVENOUS; SUBCUTANEOUS at 20:33

## 2019-02-19 RX ADMIN — THIAMINE HYDROCHLORIDE 1 MLS/HR: 100 INJECTION, SOLUTION INTRAMUSCULAR; INTRAVENOUS at 18:55

## 2019-02-19 RX ADMIN — METOCLOPRAMIDE HYDROCHLORIDE 1 MG: 10 INJECTION, SOLUTION INTRAMUSCULAR; INTRAVENOUS at 23:42

## 2019-02-19 RX ADMIN — HEPARIN SODIUM SCH UNIT: 5000 INJECTION, SOLUTION INTRAVENOUS; SUBCUTANEOUS at 08:10

## 2019-02-19 RX ADMIN — INSULIN ASPART 1 UNIT: 100 INJECTION, SOLUTION INTRAVENOUS; SUBCUTANEOUS at 08:10

## 2019-02-19 NOTE — CONS
Assessment/Plan


Assessment/Plan


Assessment/Plan (Daily)


ASSESSMENT AND PLAN:  A 63-year-old gentleman with inoperable biliary 


cholangiocarcinoma, poorly differentiated, post Port-A-Cath.  Has cycle 1 


gemcitabine platinum per protocol, now gastric outlet obstruction.  


GI evaluation improved by  NG suction. 


No active intervention at this time


GI f/up better


DC home later


Out patient gemzar platinum per protocol


post stent placement


improved bilirubin from 19 >> 3





Consultation Date/Type/Reason


Admit Date/Time


better


no pain 


no vomitinr


no bleeding


Initial Consult Date





Date/Time of Note


DATE: 2/19/19 


TIME: 13:56





24 HR Interval Summary


Free Text/Dictation


batter today 


improving


no vomiting


no bleeding





S1S2


Clear lungs


soft abdomen


mild jaundice





Exam/Review of Systems


Exam


Vitals





Vital Signs


  Date      Temp  Pulse  Resp  B/P (MAP)   Pulse Ox  O2          O2 Flow    FiO2


Time                                                 Delivery    Rate


   2/19/19  98.0     57    16      154/74        98  Room Air


     13:41                          (100)








Intake and Output





2/18/19 2/18/19 2/19/19





1515:00


23:00


07:00





IntakeIntake Total


730 ml


2740 ml


1696 ml





OutputOutput Total


700 ml





BalanceBalance


730 ml


2740 ml


996 ml











Constitutional:  alert, oriented


Eyes:  nl conjunctiva, EOMI


ENMT:  nl external ears & nose, nl lips & teeth


Neck:  supple, non-tender


Respiratory:  clear to auscultation, normal air movement


Cardiovascular:  regular rate and rhythm, nl pulses


Gastrointestinal:  soft, nl liver, spleen, non-tender


Musculoskeletal:  nl extremities to inspection





Results


Result Diagram:  


2/18/19 0447                                                                    


           2/18/19 0447





Results 24hrs





Laboratory Tests


  Test
            2/18/19
17:34  2/18/19
21:02  2/19/19
08:04  2/19/19
12:26


  Bedside Glucose          148            124            148            163








Medications


Medication





Current Medications


Sodium Chloride 1,000 ml @  100 mls/hr Q10H IV  Last administered on 2/19/19at 


12:44; Admin Dose 100 MLS/HR;  Start 2/14/19 at 18:55


IV Flush (NS 3 ml) 3 ml PER PROTOCOL IV ;  Start 2/14/19 at 19:00


Ondansetron HCl (Zofran Inj) 4 mg Q6H  PRN IV NAUSEA/VOMITING;  Start 2/14/19 at


19:00


Acetaminophen (Tylenol Tab) 650 mg Q6H  PRN PO .PAIN 1-3 OR TEMP Last 


administered on 2/17/19at 14:40; Admin Dose 650 MG;  Start 2/14/19 at 19:00


Acetaminophen/ Hydrocodone Bitart (Norco (5/325)) 1 tab Q6H  PRN PO .MOD PAIN 4-


6;  Start 2/14/19 at 19:00


Morphine Sulfate (morphine) 2 mg Q4H  PRN IV .SEVERE PAIN 7-10 Last administered


on 2/19/19at 08:05; Admin Dose 2 MG;  Start 2/14/19 at 19:00


Heparin Sodium (Porcine) (Heparin  (5000 Units/1ml)) 5,000 unit Q12 SC  Last 


administered on 2/19/19at 08:10; Admin Dose 5,000 UNIT;  Start 2/14/19 at 21:00


Diagnostic Test (Pha) (Accu-Chek) 1 ea 02 XX ;  Start 2/15/19 at 02:00


Miscellaneous Information 1 ea NOTE XX ;  Start 2/14/19 at 19:30


Glucose (Glutose) 15 gm Q15M  PRN PO DECREASED GLUCOSE;  Start 2/14/19 at 19:30


Glucose (Glutose) 22.5 gm Q15M  PRN PO DECREASED GLUCOSE;  Start 2/14/19 at 


19:30


Dextrose (D50w Syringe) 25 ml Q15M  PRN IV DECREASED GLUCOSE;  Start 2/14/19 at 


19:30


Dextrose (D50w Syringe) 50 ml Q15M  PRN IV DECREASED GLUCOSE;  Start 2/14/19 at 


19:30


Glucagon (Glucagen) 1 mg Q15M  PRN IM DECREASED GLUCOSE;  Start 2/14/19 at 19:30


Glucose (Glutose) 15 gm Q15M  PRN BUCCAL DECREASED GLUCOSE;  Start 2/14/19 at 


19:30


Piperacillin Sod/ Tazobactam Sod 100 ml @  200 mls/hr Q6 IVPB  Last administered


on 2/19/19at 12:44; Admin Dose 200 MLS/HR;  Start 2/15/19 at 00:30


Bisacodyl (Dulcolax Supp) 10 mg DAILY  PRN [place] CONSTIPATION Last administered on 


2/16/19at 22:16; Admin Dose 10 MG;  Start 2/16/19 at 12:00


Metoclopramide HCl (Reglan) 10 mg Q6 IV  Last administered on 2/19/19at 12:44; 


Admin Dose 10 MG;  Start 2/17/19 at 13:30


Insulin Aspart (Novolog Insulin Pen) NOVOLOG *MILD* ALGORITHM WITH MEALS  


BEDTIME SC  Last administered on 2/19/19at 12:53; Admin Dose 1 UNIT;  Start 


2/17/19 at 21:00


Zolpidem Tartrate (Ambien) 5 mg HS MAY REPEAT X 1  PRN PO INSOMNIA Last 


administered on 2/19/19at 00:15; Admin Dose 5 MG;  Start 2/18/19 at 18:00











NAVEED MORGAN               Feb 19, 2019 14:00

## 2019-02-19 NOTE — CONS
Assessment/Plan


Assessment/Plan


Hospital Course (Demo Recall)


ID PROGRESS NOTE 


CURRENT ABX: DAY #6 =>  Zosyn 


2/18/19 0447                                                                    


           2/18/19 0447


24H INTERVAL SUMMARY


* He feels well, no fevers, no complaints 


* 02/19/19 gastric emptying Nuc-MED IMPRESSION:Normal gastric emptying rate .


* 02/18/19 UPPER GI SERIES: IMPRESSION:


      1.  Central venous catheter.


      2.  Common bile duct stent.


      3.  Undigested food in the stomach.


      4.  Mild extrinsic compression of the second part of the duodenum with 


contrast flowing through the duodenal C sweep.


      5.  Otherwise unremarkable study. 


 


* 02/17/19 ABD XR: IMPRESSION:Nonobstructive bowel gas pattern. Enteric tube 


  looped in the left upper quadrant abdomen, likely within the stomach. Residual


  enteric contrast noted within the colon. Two biliary stents are noted.


* 2/15/19 CT ABD-PEL:  IMPRESSION:


   * Mild intrahepatic ductal dilatation in patient with bilateral biliary 


     endoprosthesis.


   * No gallstones visualize. Sheet-like soft tissue surrounding gallbladder and


     within the siria hepatis as seen on recent MRI. Question cholangiocarcinoma


     versus gallbladder cancer versus metastatic adenopathy. Consider biopsy.


   * Vascular calcifications.


   * Left renal cyst.


   * No evidence of urolithiasis, obstructive uropathy, diverticulitis or 


     appendicitis.


   * Small right pleural effusion.


MICRO


* 02/15/19 BCX @ [facility] (-) 


* BCx (+) GNR @ [facility]: Microbiology at the other hospital informs us that his


  blood cultures are positive for Klebsiella species not Klebsiella pneumoniae





PHYSICAL EXAMINATION 


GENERAL: Afebrile, VSS


HEENT: AT, NC, anicteric 


NECK:  Grossly normal 


CHEST: Equal chest rise bilaterally = without dyspnea 


HEART:  Pulse RRR


ABDOMEN:  Deferred, sleeping 


EXTREMITIES:  Warm,


SKIN: No rash, no diaphoresis





ID ASSESSMENT


64 yo M admit with:


(1) SIRS w/Tmax 99.6 on TNS from [facility]


* 02/15/19 BCX @ [facility] (-) 


* BCx (+) GNR @ [facility]: Microbiology at the other hospital informs us that his


  blood cultures are positive for Klebsiella species not Klebsiella pneumoniae


(2) Obstructed, gastric outlet -> S/P EGD 2/17/18 w/report pending transcription


to chart 


* Status:  Acute 


*  Repeat obstruction and stent replaced at [facility] last week


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 


* Onset Date:  ~ 9/2018      Status:  Received 2 rounds of chemotherapy. 


* s/p 12/24/18  ERCP, sphincterotomy, brushings of the malignant stricture of 


  the bile duct, placement of 2 stents, one into the left hepatic duct and one 


  into the right hepatic duct.


(4) Diabetes mellitus type 2 in nonobese


* Status:  Chronic/Stable 


(5) H/O four vessel coronary artery bypass graft


* Onset Date:  ~ 2/2010      Status:  Chronic/asymptomatic 


ABX ALLERGIES: KNDA 


INVASIVES: PIV


CURRENT ABX: DAY #6 => Zosyn 


ID RECOMMENDATIONS/PLAN:  


1. Continue Zosyn 


2.  I requested RN staff to obtain MICRO BCx results from 2/13/19 & 2/14/19 from


[facility] and put hardcopy on the chart for review tomorrow. 


3. DC PLANNING: Anticipate total 14 days ABX. May change to PO Cipro/Levaquin/or


Augmentin 875mg poQ12H on days#8-14 if sensitivities can be confirmed. 


* If not sensitive to PO's listed, then consider DC on Ertapenem 1GM IVPB to c


  omplete 14 days. 





.





Consultation Date/Type/Reason


Admit Date/Time


Feb 14, 2019 at 18:07


Initial Consult Date





Date/Time of Note


DATE: 2/19/19 


TIME: 16:49





Exam/Review of Systems


Exam


Vitals





Vital Signs


  Date      Temp  Pulse  Resp  B/P (MAP)   Pulse Ox  O2          O2 Flow    FiO2


Time                                                 Delivery    Rate


   2/19/19  98.0     57    16      154/74        98  Room Air


     13:41                          (100)








Intake and Output





2/18/19 2/18/19 2/19/19





1515:00


23:00


07:00





IntakeIntake Total


730 ml


2740 ml


1696 ml





OutputOutput Total


700 ml





BalanceBalance


730 ml


2740 ml


996 ml














Results


Result Diagram:  


2/18/19 0447                                                                    


           2/18/19 0447





Results 24hrs





Laboratory Tests


  Test
            2/18/19
17:34  2/18/19
21:02  2/19/19
08:04  2/19/19
12:26


  Bedside Glucose          148            124            148            163








Medications


Medication





Current Medications


Sodium Chloride 1,000 ml @  100 mls/hr Q10H IV  Last administered on 2/19/19at 


12:44; Admin Dose 100 MLS/HR;  Start 2/14/19 at 18:55


IV Flush (NS 3 ml) 3 ml PER PROTOCOL IV ;  Start 2/14/19 at 19:00


Ondansetron HCl (Zofran Inj) 4 mg Q6H  PRN IV NAUSEA/VOMITING;  Start 2/14/19 at


19:00


Acetaminophen (Tylenol Tab) 650 mg Q6H  PRN PO .PAIN 1-3 OR TEMP Last 


administered on 2/17/19at 14:40; Admin Dose 650 MG;  Start 2/14/19 at 19:00


Acetaminophen/ Hydrocodone Bitart (Norco (5/325)) 1 tab Q6H  PRN PO .MOD PAIN 4-


6;  Start 2/14/19 at 19:00


Morphine Sulfate (morphine) 2 mg Q4H  PRN IV .SEVERE PAIN 7-10 Last administered


on 2/19/19at 08:05; Admin Dose 2 MG;  Start 2/14/19 at 19:00


Heparin Sodium (Porcine) (Heparin  (5000 Units/1ml)) 5,000 unit Q12 SC  Last 


administered on 2/19/19at 08:10; Admin Dose 5,000 UNIT;  Start 2/14/19 at 21:00


Diagnostic Test (Pha) (Accu-Chek) 1 ea 02 XX ;  Start 2/15/19 at 02:00


Miscellaneous Information 1 ea NOTE XX ;  Start 2/14/19 at 19:30


Glucose (Glutose) 15 gm Q15M  PRN PO DECREASED GLUCOSE;  Start 2/14/19 at 19:30


Glucose (Glutose) 22.5 gm Q15M  PRN PO DECREASED GLUCOSE;  Start 2/14/19 at 


19:30


Dextrose (D50w Syringe) 25 ml Q15M  PRN IV DECREASED GLUCOSE;  Start 2/14/19 at 


19:30


Dextrose (D50w Syringe) 50 ml Q15M  PRN IV DECREASED GLUCOSE;  Start 2/14/19 at 


19:30


Glucagon (Glucagen) 1 mg Q15M  PRN IM DECREASED GLUCOSE;  Start 2/14/19 at 19:30


Glucose (Glutose) 15 gm Q15M  PRN BUCCAL DECREASED GLUCOSE;  Start 2/14/19 at 


19:30


Piperacillin Sod/ Tazobactam Sod 100 ml @  200 mls/hr Q6 IVPB  Last administered


on 2/19/19at 12:44; Admin Dose 200 MLS/HR;  Start 2/15/19 at 00:30


Bisacodyl (Dulcolax Supp) 10 mg DAILY  PRN [place] CONSTIPATION Last administered on 


2/16/19at 22:16; Admin Dose 10 MG;  Start 2/16/19 at 12:00


Metoclopramide HCl (Reglan) 10 mg Q6 IV  Last administered on 2/19/19at 12:44; 


Admin Dose 10 MG;  Start 2/17/19 at 13:30


Insulin Aspart (Novolog Insulin Pen) NOVOLOG *MILD* ALGORITHM WITH MEALS  


BEDTIME SC  Last administered on 2/19/19at 12:53; Admin Dose 1 UNIT;  Start 


2/17/19 at 21:00


Zolpidem Tartrate (Ambien) 5 mg HS MAY REPEAT X 1  PRN PO INSOMNIA Last 


administered on 2/19/19at 00:15; Admin Dose 5 MG;  Start 2/18/19 at 18:00











ANTONIO LYNCH NP              Feb 19, 2019 16:57

## 2019-02-19 NOTE — PN
Date/Time of Note


Date/Time of Note


DATE: 2/19/19 


TIME: 13:49





Assessment/Plan


VTE Prophylaxis


Risk score (from Nsg)>0 risk:  4


SCD applied (from Nsg):  Yes


Pharmacological prophylaxis:  heparin





Lines/Catheters


IV Catheter Type (from Nrsg):  Peripheral IV


Urinary Cath still in place:  No





Assessment/Plan


Assessment/Plan


1. Bacteremia, negative repeat blood culture, follow up with final blood culture


result done in [facility], on zosyn 


2. 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


with stent placed here.  Then repeat obstruction and stent replaced at [facility] last


week.  Has received 2 doses chemo, follow up with oncology


3. Mild extrinsic compression of the second part of the duodenum 


4. DM,  insulin scale


5. CAD, s/p CABG, stable


6. DVT prophylaxis: heparin SQ


7. Follow up with gastric emptying test


Result Diagram:  


2/18/19 0447 2/18/19 0447





Results 24hrs





Laboratory Tests


  Test
            2/18/19
17:34  2/18/19
21:02  2/19/19
08:04  2/19/19
12:26


  Bedside Glucose          148            124            148            163








Subjective


24 Hr Interval Summary


Free Text/Dictation


no nausea or vomiting





Exam/Review of Systems


Exam


Vitals





Vital Signs


  Date      Temp  Pulse  Resp  B/P (MAP)   Pulse Ox  O2          O2 Flow    FiO2


Time                                                 Delivery    Rate


   2/19/19  98.0     57    16      154/74        98  Room Air


     13:41                          (100)








Intake and Output





2/18/19 2/18/19 2/19/19





1515:00


23:00


07:00





IntakeIntake Total


730 ml


2740 ml


1696 ml





OutputOutput Total


700 ml





BalanceBalance


730 ml


2740 ml


996 ml











Constitutional:  alert, oriented, well developed


Psych:  no complaints, nl mood/affect


Head:  normocephalic, atraumatic


Eyes:  nl conjunctiva, EOMI, nl lids


ENMT:  nl external ears & nose, nl lips & teeth, nl nasal mucosa & septum


Neck:  supple


Respiratory:  clear to auscultation, normal air movement


Cardiovascular:  regular rate and rhythm, nl pulses; 


   No bruits, No diastolic murmur, No edema, No gallop, No irregular rhythm, No 


jugular venous distention (JVD), No murmurs/extra sounds, No rub, No systolic 


murmur, No S3, No S4, No other


Gastrointestinal:  soft, nl liver, spleen, non-tender


Musculoskeletal:  nl extremities to inspection


Extremities:  normal pulses; 


   No calf tenderness, No cyanosis, No clubbing, No edema, No pitting pedal 


edema, No palpable cord, No tenderness, No other


Neurological:  CNS II-XII intact, nl mental status, nl speech, nl strength





Results


Results 24hrs





Laboratory Tests


  Test
            2/18/19
17:34  2/18/19
21:02  2/19/19
08:04  2/19/19
12:26


  Bedside Glucose          148            124            148            163








Medications


Medication





Current Medications


Sodium Chloride 1,000 ml @  100 mls/hr Q10H IV  Last administered on 2/19/19at 


12:44; Admin Dose 100 MLS/HR;  Start 2/14/19 at 18:55


IV Flush (NS 3 ml) 3 ml PER PROTOCOL IV ;  Start 2/14/19 at 19:00


Ondansetron HCl (Zofran Inj) 4 mg Q6H  PRN IV NAUSEA/VOMITING;  Start 2/14/19 at


19:00


Acetaminophen (Tylenol Tab) 650 mg Q6H  PRN PO .PAIN 1-3 OR TEMP Last 


administered on 2/17/19at 14:40; Admin Dose 650 MG;  Start 2/14/19 at 19:00


Acetaminophen/ Hydrocodone Bitart (Norco (5/325)) 1 tab Q6H  PRN PO .MOD PAIN 4-


6;  Start 2/14/19 at 19:00


Morphine Sulfate (morphine) 2 mg Q4H  PRN IV .SEVERE PAIN 7-10 Last administered


on 2/19/19at 08:05; Admin Dose 2 MG;  Start 2/14/19 at 19:00


Heparin Sodium (Porcine) (Heparin  (5000 Units/1ml)) 5,000 unit Q12 SC  Last 


administered on 2/19/19at 08:10; Admin Dose 5,000 UNIT;  Start 2/14/19 at 21:00


Diagnostic Test (Pha) (Accu-Chek) 1 ea 02 XX ;  Start 2/15/19 at 02:00


Miscellaneous Information 1 ea NOTE XX ;  Start 2/14/19 at 19:30


Glucose (Glutose) 15 gm Q15M  PRN PO DECREASED GLUCOSE;  Start 2/14/19 at 19:30


Glucose (Glutose) 22.5 gm Q15M  PRN PO DECREASED GLUCOSE;  Start 2/14/19 at 


19:30


Dextrose (D50w Syringe) 25 ml Q15M  PRN IV DECREASED GLUCOSE;  Start 2/14/19 at 


19:30


Dextrose (D50w Syringe) 50 ml Q15M  PRN IV DECREASED GLUCOSE;  Start 2/14/19 at 


19:30


Glucagon (Glucagen) 1 mg Q15M  PRN IM DECREASED GLUCOSE;  Start 2/14/19 at 19:30


Glucose (Glutose) 15 gm Q15M  PRN BUCCAL DECREASED GLUCOSE;  Start 2/14/19 at 


19:30


Piperacillin Sod/ Tazobactam Sod 100 ml @  200 mls/hr Q6 IVPB  Last administered


on 2/19/19at 12:44; Admin Dose 200 MLS/HR;  Start 2/15/19 at 00:30


Bisacodyl (Dulcolax Supp) 10 mg DAILY  PRN [place] CONSTIPATION Last administered on 


2/16/19at 22:16; Admin Dose 10 MG;  Start 2/16/19 at 12:00


Metoclopramide HCl (Reglan) 10 mg Q6 IV  Last administered on 2/19/19at 12:44; 


Admin Dose 10 MG;  Start 2/17/19 at 13:30


Insulin Aspart (Novolog Insulin Pen) NOVOLOG *MILD* ALGORITHM WITH MEALS  


BEDTIME SC  Last administered on 2/19/19at 12:53; Admin Dose 1 UNIT;  Start 


2/17/19 at 21:00


Zolpidem Tartrate (Ambien) 5 mg HS MAY REPEAT X 1  PRN PO INSOMNIA Last 


administered on 2/19/19at 00:15; Admin Dose 5 MG;  Start 2/18/19 at 18:00











ELIZA NEWBERRY MD                Feb 19, 2019 14:00

## 2019-02-20 VITALS — RESPIRATION RATE: 18 BRPM | HEART RATE: 57 BPM | SYSTOLIC BLOOD PRESSURE: 157 MMHG | DIASTOLIC BLOOD PRESSURE: 71 MMHG

## 2019-02-20 VITALS — DIASTOLIC BLOOD PRESSURE: 77 MMHG | SYSTOLIC BLOOD PRESSURE: 166 MMHG | HEART RATE: 63 BPM | RESPIRATION RATE: 18 BRPM

## 2019-02-20 VITALS — DIASTOLIC BLOOD PRESSURE: 76 MMHG | RESPIRATION RATE: 18 BRPM | SYSTOLIC BLOOD PRESSURE: 151 MMHG | HEART RATE: 68 BPM

## 2019-02-20 RX ADMIN — METOCLOPRAMIDE HYDROCHLORIDE SCH MG: 10 INJECTION, SOLUTION INTRAMUSCULAR; INTRAVENOUS at 12:27

## 2019-02-20 RX ADMIN — METOCLOPRAMIDE HYDROCHLORIDE SCH MG: 10 INJECTION, SOLUTION INTRAMUSCULAR; INTRAVENOUS at 05:16

## 2019-02-20 RX ADMIN — METOCLOPRAMIDE HYDROCHLORIDE 1 MG: 10 INJECTION, SOLUTION INTRAMUSCULAR; INTRAVENOUS at 12:27

## 2019-02-20 RX ADMIN — THIAMINE HYDROCHLORIDE 1 MLS/HR: 100 INJECTION, SOLUTION INTRAMUSCULAR; INTRAVENOUS at 04:55

## 2019-02-20 RX ADMIN — THIAMINE HYDROCHLORIDE 1 MLS/HR: 100 INJECTION, SOLUTION INTRAMUSCULAR; INTRAVENOUS at 03:42

## 2019-02-20 RX ADMIN — INSULIN ASPART 1 UNIT: 100 INJECTION, SOLUTION INTRAVENOUS; SUBCUTANEOUS at 08:15

## 2019-02-20 RX ADMIN — INSULIN ASPART 1 UNIT: 100 INJECTION, SOLUTION INTRAVENOUS; SUBCUTANEOUS at 12:24

## 2019-02-20 RX ADMIN — LEVOFLOXACIN 1 MLS/HR: 5 INJECTION, SOLUTION INTRAVENOUS at 14:50

## 2019-02-20 RX ADMIN — PIPERACILLIN SODIUM AND TAZOBACTAM SODIUM 1 MLS/HR: 3; .375 INJECTION, POWDER, LYOPHILIZED, FOR SOLUTION INTRAVENOUS at 12:27

## 2019-02-20 RX ADMIN — PIPERACILLIN SODIUM AND TAZOBACTAM SODIUM SCH MLS/HR: 3; .375 INJECTION, POWDER, LYOPHILIZED, FOR SOLUTION INTRAVENOUS at 12:27

## 2019-02-20 RX ADMIN — HEPARIN SODIUM 1 UNIT: 5000 INJECTION, SOLUTION INTRAVENOUS; SUBCUTANEOUS at 08:15

## 2019-02-20 RX ADMIN — PIPERACILLIN SODIUM AND TAZOBACTAM SODIUM SCH MLS/HR: 3; .375 INJECTION, POWDER, LYOPHILIZED, FOR SOLUTION INTRAVENOUS at 05:16

## 2019-02-20 RX ADMIN — METOCLOPRAMIDE HYDROCHLORIDE 1 MG: 10 INJECTION, SOLUTION INTRAMUSCULAR; INTRAVENOUS at 05:16

## 2019-02-20 RX ADMIN — FOLIC ACID SCH MLS/HR: 5 INJECTION, SOLUTION INTRAMUSCULAR; INTRAVENOUS; SUBCUTANEOUS at 03:42

## 2019-02-20 RX ADMIN — FOLIC ACID SCH MLS/HR: 5 INJECTION, SOLUTION INTRAMUSCULAR; INTRAVENOUS; SUBCUTANEOUS at 04:55

## 2019-02-20 RX ADMIN — HEPARIN SODIUM SCH UNIT: 5000 INJECTION, SOLUTION INTRAVENOUS; SUBCUTANEOUS at 08:15

## 2019-02-20 RX ADMIN — PIPERACILLIN SODIUM AND TAZOBACTAM SODIUM 1 MLS/HR: 3; .375 INJECTION, POWDER, LYOPHILIZED, FOR SOLUTION INTRAVENOUS at 05:16

## 2019-02-20 NOTE — CONS
Assessment/Plan


Assessment/Plan


Hospital Course (Demo Recall)


ID PROGRESS NOTE 


CURRENT ABX: DAY #7=>  Zosyn 


2/18/19 0447                                                                    


           2/18/19 0447


24H INTERVAL SUMMARY


* A/A/O -- no complaints -- eager to learn if he can DC home, no fevers, VSS, no


  new questions about ABX plan of care 


* MICRO RESULTS FROM [facility] NOW ON CHART 


   * BCx 2/13/19 grew Raoultella planticola = SENSITIVE to Unasyn, Ceftriaxone, 


     Bactrim, Zosyn, Levaquin 


* 02/19/19 gastric emptying Nuc-MED IMPRESSION:Normal gastric emptying rate .


* 02/18/19 UPPER GI SERIES: IMPRESSION:


      1.  Central venous catheter.


      2.  Common bile duct stent.


      3.  Undigested food in the stomach.


      4.  Mild extrinsic compression of the second part of the duodenum with 


contrast flowing through the duodenal C sweep.


      5.  Otherwise unremarkable study. 


* 02/17/19 ABD XR: IMPRESSION:Nonobstructive bowel gas pattern. Enteric tube 


  looped in the left upper quadrant abdomen, likely within the stomach. Residual


  enteric contrast noted within the colon. Two biliary stents are noted.


* 2/15/19 CT ABD-PEL:  IMPRESSION:


   * Mild intrahepatic ductal dilatation in patient with bilateral biliary 


     endoprosthesis.


   * No gallstones visualize. Sheet-like soft tissue surrounding gallbladder and


     within the siria hepatis as seen on recent MRI. Question cholangiocarcinoma


     versus gallbladder cancer versus metastatic adenopathy. Consider biopsy.


   * Vascular calcifications.


   * Left renal cyst.


   * No evidence of urolithiasis, obstructive uropathy, diverticulitis or a


     ppendicitis.


   * Small right pleural effusion.


MICRO


* 02/15/19 BCX @ [facility] (-) 


* BCx (+) GNR @ [facility]: Microbiology at the other hospital informs us that his


  blood cultures are positive for Klebsiella species not Klebsiella pneumoniae





PHYSICAL EXAMINATION 


GENERAL: Afebrile, VSS


HEENT: AT, NC, anicteric 


NECK:  Grossly normal 


CHEST: Equal chest rise bilaterally = without dyspnea 


HEART:  Pulse RRR


ABDOMEN:  Deferred, sleeping 


EXTREMITIES:  Warm,


SKIN: No rash, no diaphoresis





ID ASSESSMENT


62 yo M admit with:


(1) SIRS w/Tmax 99.6 on TNS from [facility]=> RESOLVED


* 02/15/19 BCX @ [facility] (-) 


* BCx (+) GNR @ [facility]: Microbiology at the other hospital informs us that his


  blood cultures are positive for Klebsiella species not Klebsiella pneumoniae


* MICRO RESULTS FROM [facility] NOW ON CHART 


   * BCx 2/13/19 grew Raoultella planticola = SENSITIVE to Unasyn, Ceftriaxone, 


     Bactrim, Zosyn, Levaquin 


(2) Obstructed, gastric outlet -> S/P EGD 2/17/18 ==GI on the case 


*  Repeat obstruction and stent replaced at [facility] last week


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 


* Onset Date:  ~ 9/2018      Status:  Received 2 rounds of chemotherapy. 


* s/p 12/24/18  ERCP, sphincterotomy, brushings of the malignant stricture of 


  the bile duct, placement of 2 stents, one into the left hepatic duct and one 


  into the right hepatic duct.


(4) Diabetes mellitus type 2 in nonobese


* Status:  Chronic/Stable 


(5) H/O four vessel coronary artery bypass graft


* Onset Date:  ~ 2/2010      Status:  Chronic/asymptomatic 


ABX ALLERGIES: KNDA 


INVASIVES: PIV


CURRENT ABX: DAY #7 => Zosyn 


ID RECOMMENDATIONS/PLAN:  


1.MICRO RESULTS FROM [facility] NOW ON CHART 


   * BCx 2/13/19 grew Raoultella planticola = SENSITIVE to Unasyn, Ceftriaxone, 


     Bactrim, Zosyn, Levaquin 


   * DC Zosyn today and give Levaquin 750mg IV x 1 today 


4. DC PLANNING: 


* Patient may DC home on Levaquin 500 mg po daily x 7 days to start tomorrow 


  2/21/19 .





.





Consultation Date/Type/Reason


Admit Date/Time


Feb 14, 2019 at 18:07


Initial Consult Date





Date/Time of Note


DATE: 2/20/19 


TIME: 13:08





Exam/Review of Systems


Exam


Vitals





Vital Signs


  Date      Temp  Pulse  Resp  B/P (MAP)   Pulse Ox  O2          O2 Flow    FiO2


Time                                                 Delivery    Rate


   2/20/19  97.8     63    18      166/77        98  Room Air


     07:49                          (106)








Intake and Output





2/19/19 2/19/19 2/20/19





1414:59


22:59


06:59





IntakeIntake Total


1200 ml


1080 ml


1140 ml





OutputOutput Total


675 ml


225 ml


1000 ml





BalanceBalance


525 ml


855 ml


140 ml














Results


Result Diagram:  


2/18/19 0447                                                                    


           2/18/19 0447





Results 24hrs





Laboratory Tests


  Test
            2/19/19
17:32  2/19/19
20:28  2/20/19
07:44  2/20/19
12:10


  Bedside Glucose         270  H          179            150            184








Medications


Medication





Current Medications


Sodium Chloride 1,000 ml @  100 mls/hr Q10H IV  Last administered on 2/20/19at 


03:42; Admin Dose 100 MLS/HR;  Start 2/14/19 at 18:55


IV Flush (NS 3 ml) 3 ml PER PROTOCOL IV ;  Start 2/14/19 at 19:00


Ondansetron HCl (Zofran Inj) 4 mg Q6H  PRN IV NAUSEA/VOMITING;  Start 2/14/19 at


19:00


Acetaminophen (Tylenol Tab) 650 mg Q6H  PRN PO .PAIN 1-3 OR TEMP Last 


administered on 2/17/19at 14:40; Admin Dose 650 MG;  Start 2/14/19 at 19:00


Acetaminophen/ Hydrocodone Bitart (Norco (5/325)) 1 tab Q6H  PRN PO .MOD PAIN 


4-6;  Start 2/14/19 at 19:00


Heparin Sodium (Porcine) (Heparin  (5000 Units/1ml)) 5,000 unit Q12 SC  Last 


administered on 2/20/19at 08:15; Admin Dose 5,000 UNIT;  Start 2/14/19 at 21:00


Diagnostic Test (Pha) (Accu-Chek) 1 ea 02 XX ;  Start 2/15/19 at 02:00


Miscellaneous Information 1 ea NOTE XX ;  Start 2/14/19 at 19:30


Glucose (Glutose) 15 gm Q15M  PRN PO DECREASED GLUCOSE;  Start 2/14/19 at 19:30


Glucose (Glutose) 22.5 gm Q15M  PRN PO DECREASED GLUCOSE;  Start 2/14/19 at 


19:30


Dextrose (D50w Syringe) 25 ml Q15M  PRN IV DECREASED GLUCOSE;  Start 2/14/19 at 


19:30


Dextrose (D50w Syringe) 50 ml Q15M  PRN IV DECREASED GLUCOSE;  Start 2/14/19 at 


19:30


Glucagon (Glucagen) 1 mg Q15M  PRN IM DECREASED GLUCOSE;  Start 2/14/19 at 19:30


Glucose (Glutose) 15 gm Q15M  PRN BUCCAL DECREASED GLUCOSE;  Start 2/14/19 at 


19:30


Piperacillin Sod/ Tazobactam Sod 100 ml @  200 mls/hr Q6 IVPB  Last administered


on 2/20/19at 12:27; Admin Dose 200 MLS/HR;  Start 2/15/19 at 00:30


Bisacodyl (Dulcolax Supp) 10 mg DAILY  PRN NV CONSTIPATION Last administered on 


2/16/19at 22:16; Admin Dose 10 MG;  Start 2/16/19 at 12:00


Metoclopramide HCl (Reglan) 10 mg Q6 IV  Last administered on 2/20/19at 12:27; 


Admin Dose 10 MG;  Start 2/17/19 at 13:30


Insulin Aspart (Novolog Insulin Pen) NOVOLOG *MILD* ALGORITHM WITH MEALS  B


EDTIME SC  Last administered on 2/20/19at 12:24; Admin Dose 2 UNIT;  Start 


2/17/19 at 21:00


Morphine Sulfate (morphine) 6 mg Q4H  PRN PO SEVERE PAIN LEVEL 7-10;  Start 


2/19/19 at 18:00


Zolpidem Tartrate (Ambien) 10 mg HS  PRN PO INSOMNIA Last administered on 


2/19/19at 21:25; Admin Dose 10 MG;  Start 2/19/19 at 21:00











ANTONIO LYNCH NP              Feb 20, 2019 13:19

## 2019-02-20 NOTE — DS
Date/Time of Note


Date/Time of Note


DATE: 2/20/19 


TIME: 15:57





Discharge Summary


Admission/Discharge Info


Admit Date/Time


Feb 14, 2019 at 18:07


Discharge Date/Time





Discharge Diagnosis


1. Bacteremia, levaquin for 7 days


2. 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 


gastric emptying, PPI and reglan, follow up with Dr. Maynard. 


3. 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


with stent placed here.  Then repeat obstruction and stent replaced at [facility] last


week.  Has received 2 doses chemo, follow up with oncology


4. DM,  stable


5. CAD, s/p CABG, stable


Patient Condition:  Stable


Hospital Course


63-year-old [place] gentleman who is admitted to the hospital because of 


vomiting.  Apparently, he is known to have a cholangiocarcinoma and he had 2 


plastic stents put in this hospital, 1 into the left and 1 into the right 


hepatic duct.  Subsequently, he went to [facility].  He had 2 metal stents placed, 1 


into the right and 1 into the left hepatic duct.  He is being treated with 


chemotherapy at this time.  He has received 2 courses of chemotherapy. CAT scan 


of the abdomen showed evidence of a cholangiocarcinoma, perhaps invading into 


the gastroduodenal junction.  The images are not available.





Further work up for slow gastric emptying, nuclear gastric emptying test is 


normal, but upper Gi series revealed Mild extrinsic compression of the second 


part of the duodenum with contrast flowing through the duodenal C sweep. Case 


discussed with Dr. Maynard who feels the duodenal narrowing is the cause of his 


vomiting and slow gastric emptying. Dr. Maynard recommends reglan and protonix 


and follow up with him outpatient. He may need stent if symptoms persistent. 


Patient has no vomiting today.





Blood culture on 2/13/19 grew Raoultella planticola = SENSITIVE to Unasyn, 


Ceftriaxone, Bactrim, Zosyn, Levaquin. Patient will be discharge don levaquin 


for 7 days.


Home Meds


Active Scripts


Pantoprazole* (Protonix*) 40 Mg Tablet., 40 MG PO DAILY for 30 Days, TAB


   Prov:ELIZA NEWBERRY MD         2/20/19


Metoclopramide* (Reglan*) 5 Mg Tablet, 5 MG PO AC MEALS for 30 Days, TAB


   Prov:ELIZA NEWBERRY MD         2/20/19


Zolpidem Tartrate (Ambien José Miguel) 5 Mg Tablet, 5 MG PO HS PRN for INSOMNIA for 10 


Days, TAB


   Prov:ELIZA NEWBERRY MD         2/20/19


Levofloxacin* (Levaquin*) 500 Mg Tablet, 500 MG PO DAILY@06 for 7 Days, TAB


   Prov:ELIZA NEWBERRY MD         2/20/19


Insulin Glargine,Hum.rec.anlog (Basaglar Kwikpen U-100) 100 Unit/1 Ml 


Insuln.pen, 30 UNIT SC DAILY for 30 Days, #2 EA 5 Refills


   Prov:REJI EVANS MD         12/28/18


Insulin Aspart* (Novolog Insulin Pen*) 100 Unit/Ml Soln, 10 UNIT SC WITH MEALS 


for 30 Days, #5 EA 3 Refills


   Prov:REJI EVANS MD         12/28/18


Ursodiol* (Actigall*) 300 Mg Cap, 300 MG PO TID for 30 Days, #90 CAP


   Prov:REJI EVANS MD         12/28/18


Follow-up Plan


PCP and Dr. Maynard in one week


Primary Care Provider


Not On Staff Doctor


Pending Labs





Laboratory Tests


Test
              2/19/19
17:32   2/19/19
20:28   2/20/19
07:44   2/20/19
12:10


Bedside                      270             179             150             184


Glucose
          mg/dL
()  mg/dL
()














ELIZA NEWBERRY MD                Feb 20, 2019 16:05

## 2022-04-15 NOTE — NUR
Addended by: LEANNA TENORIO on: 4/14/2022 09:22 PM     Modules accepted: Orders     END OF SHIFT NOTE: PATIENT HAS BEEN AMBULATING IN THE HALLWAY. NO COMPLAINTS OF PAIN AND NO 
SIGNS OF DISTRESS. BLOOD SUGAR STILL ON HIGH LEVEL WITH NOVOLOG AND [employer] COVERAGE. FALL 
PRECAUTION OBSERVED. ALL NEEDS MET. - - - 2022